# Patient Record
Sex: FEMALE | Race: WHITE | HISPANIC OR LATINO | Employment: FULL TIME | URBAN - METROPOLITAN AREA
[De-identification: names, ages, dates, MRNs, and addresses within clinical notes are randomized per-mention and may not be internally consistent; named-entity substitution may affect disease eponyms.]

---

## 2017-03-09 ENCOUNTER — GENERIC CONVERSION - ENCOUNTER (OUTPATIENT)
Dept: OTHER | Facility: OTHER | Age: 52
End: 2017-03-09

## 2017-03-24 ENCOUNTER — GENERIC CONVERSION - ENCOUNTER (OUTPATIENT)
Dept: OTHER | Facility: OTHER | Age: 52
End: 2017-03-24

## 2017-03-31 ENCOUNTER — HOSPITAL ENCOUNTER (EMERGENCY)
Facility: HOSPITAL | Age: 52
Discharge: HOME | End: 2017-03-31
Attending: EMERGENCY MEDICINE | Admitting: EMERGENCY MEDICINE
Payer: COMMERCIAL

## 2017-03-31 ENCOUNTER — APPOINTMENT (EMERGENCY)
Dept: RADIOLOGY | Facility: HOSPITAL | Age: 52
End: 2017-03-31
Payer: COMMERCIAL

## 2017-03-31 ENCOUNTER — APPOINTMENT (EMERGENCY)
Dept: NON INVASIVE DIAGNOSTICS | Facility: HOSPITAL | Age: 52
End: 2017-03-31
Attending: INTERNAL MEDICINE
Payer: COMMERCIAL

## 2017-03-31 VITALS
DIASTOLIC BLOOD PRESSURE: 63 MMHG | OXYGEN SATURATION: 100 % | SYSTOLIC BLOOD PRESSURE: 140 MMHG | WEIGHT: 117 LBS | HEART RATE: 89 BPM | BODY MASS INDEX: 20.73 KG/M2 | RESPIRATION RATE: 16 BRPM | TEMPERATURE: 98.3 F | HEIGHT: 63 IN

## 2017-03-31 DIAGNOSIS — R07.9 CHEST PAIN: Primary | ICD-10-CM

## 2017-03-31 LAB
ALBUMIN SERPL BCP-MCNC: 3.6 G/DL (ref 3.5–5)
ALP SERPL-CCNC: 46 U/L (ref 46–116)
ALT SERPL W P-5'-P-CCNC: 46 U/L (ref 12–78)
ANION GAP SERPL CALCULATED.3IONS-SCNC: 7 MMOL/L (ref 4–13)
APTT PPP: 28 SECONDS (ref 24–33)
AST SERPL W P-5'-P-CCNC: 20 U/L (ref 5–45)
BASOPHILS # BLD AUTO: 0 THOUSANDS/ΜL (ref 0–0.1)
BASOPHILS NFR BLD AUTO: 0 % (ref 0–1)
BILIRUB SERPL-MCNC: 0.4 MG/DL (ref 0.2–1)
BUN SERPL-MCNC: 13 MG/DL (ref 5–25)
CALCIUM SERPL-MCNC: 8.7 MG/DL (ref 8.3–10.1)
CHLORIDE SERPL-SCNC: 103 MMOL/L (ref 100–108)
CK SERPL-CCNC: 50 U/L (ref 26–192)
CO2 SERPL-SCNC: 31 MMOL/L (ref 21–32)
CREAT SERPL-MCNC: 0.66 MG/DL (ref 0.6–1.3)
EOSINOPHIL # BLD AUTO: 0.1 THOUSAND/ΜL (ref 0–0.61)
EOSINOPHIL NFR BLD AUTO: 1 % (ref 0–6)
ERYTHROCYTE [DISTWIDTH] IN BLOOD BY AUTOMATED COUNT: 12.7 % (ref 11.6–15.1)
GFR SERPL CREATININE-BSD FRML MDRD: >60 ML/MIN/1.73SQ M
GLUCOSE SERPL-MCNC: 107 MG/DL (ref 65–140)
HCT VFR BLD AUTO: 42.3 % (ref 37–47)
HGB BLD-MCNC: 14.2 G/DL (ref 12–16)
INR PPP: 1 (ref 0.86–1.16)
LYMPHOCYTES # BLD AUTO: 1.3 THOUSANDS/ΜL (ref 0.6–4.47)
LYMPHOCYTES NFR BLD AUTO: 17 % (ref 14–44)
MCH RBC QN AUTO: 29.8 PG (ref 27–31)
MCHC RBC AUTO-ENTMCNC: 33.7 G/DL (ref 31.4–37.4)
MCV RBC AUTO: 88 FL (ref 82–98)
MONOCYTES # BLD AUTO: 0.4 THOUSAND/ΜL (ref 0.17–1.22)
MONOCYTES NFR BLD AUTO: 6 % (ref 4–12)
NEUTROPHILS # BLD AUTO: 5.7 THOUSANDS/ΜL (ref 1.85–7.62)
NEUTS SEG NFR BLD AUTO: 76 % (ref 43–75)
NRBC BLD AUTO-RTO: 0 /100 WBCS
PLATELET # BLD AUTO: 229 THOUSANDS/UL (ref 130–400)
PMV BLD AUTO: 7.9 FL (ref 8.9–12.7)
POTASSIUM SERPL-SCNC: 3.5 MMOL/L (ref 3.5–5.3)
PROT SERPL-MCNC: 7.1 G/DL (ref 6.4–8.2)
PROTHROMBIN TIME: 10.5 SECONDS (ref 9.4–11.7)
RBC # BLD AUTO: 4.79 MILLION/UL (ref 4.2–5.4)
SODIUM SERPL-SCNC: 141 MMOL/L (ref 136–145)
TROPONIN I SERPL-MCNC: <0.02 NG/ML
WBC # BLD AUTO: 7.4 THOUSAND/UL (ref 4.8–10.8)

## 2017-03-31 PROCEDURE — 36415 COLL VENOUS BLD VENIPUNCTURE: CPT | Performed by: EMERGENCY MEDICINE

## 2017-03-31 PROCEDURE — 84484 ASSAY OF TROPONIN QUANT: CPT | Performed by: EMERGENCY MEDICINE

## 2017-03-31 PROCEDURE — 93017 CV STRESS TEST TRACING ONLY: CPT

## 2017-03-31 PROCEDURE — 80053 COMPREHEN METABOLIC PANEL: CPT | Performed by: EMERGENCY MEDICINE

## 2017-03-31 PROCEDURE — 71010 HB CHEST X-RAY 1 VIEW FRONTAL (PORTABLE): CPT

## 2017-03-31 PROCEDURE — 85730 THROMBOPLASTIN TIME PARTIAL: CPT | Performed by: EMERGENCY MEDICINE

## 2017-03-31 PROCEDURE — 85610 PROTHROMBIN TIME: CPT | Performed by: EMERGENCY MEDICINE

## 2017-03-31 PROCEDURE — 85025 COMPLETE CBC W/AUTO DIFF WBC: CPT | Performed by: EMERGENCY MEDICINE

## 2017-03-31 PROCEDURE — 99285 EMERGENCY DEPT VISIT HI MDM: CPT

## 2017-03-31 PROCEDURE — 93005 ELECTROCARDIOGRAM TRACING: CPT | Performed by: EMERGENCY MEDICINE

## 2017-03-31 PROCEDURE — 82550 ASSAY OF CK (CPK): CPT | Performed by: EMERGENCY MEDICINE

## 2017-03-31 RX ORDER — ASPIRIN 81 MG/1
81 TABLET, CHEWABLE ORAL ONCE
Status: COMPLETED | OUTPATIENT
Start: 2017-03-31 | End: 2017-03-31

## 2017-03-31 RX ADMIN — ASPIRIN 81 MG CHEWABLE TABLET 81 MG: 81 TABLET CHEWABLE at 14:32

## 2017-04-03 LAB
MAX DIASTOLIC BP: 80 MMHG
MAX HEART RATE: 173 BPM
MAX PREDICTED HEART RATE: 168 BPM
MAX. SYSTOLIC BP: 148 MMHG
PROTOCOL NAME: NORMAL
TIME IN EXERCISE PHASE: 731 S

## 2017-04-05 LAB
ATRIAL RATE: 89 BPM
P AXIS: 76 DEGREES
PR INTERVAL: 140 MS
QRS AXIS: 67 DEGREES
QRSD INTERVAL: 78 MS
QT INTERVAL: 360 MS
QTC INTERVAL: 438 MS
T WAVE AXIS: 65 DEGREES
VENTRICULAR RATE: 89 BPM

## 2017-04-10 ENCOUNTER — ALLSCRIPTS OFFICE VISIT (OUTPATIENT)
Dept: OTHER | Facility: OTHER | Age: 52
End: 2017-04-10

## 2017-04-10 LAB
BILIRUB UR QL STRIP: NORMAL
CLARITY UR: NORMAL
COLOR UR: YELLOW
GLUCOSE (HISTORICAL): NORMAL
HGB UR QL STRIP.AUTO: NORMAL
KETONES UR STRIP-MCNC: NORMAL MG/DL
LEUKOCYTE ESTERASE UR QL STRIP: 25
NITRITE UR QL STRIP: NORMAL
PH UR STRIP.AUTO: 5 [PH]
PROT UR STRIP-MCNC: NORMAL MG/DL
SP GR UR STRIP.AUTO: 1.02
UROBILINOGEN UR QL STRIP.AUTO: NORMAL

## 2017-04-14 ENCOUNTER — GENERIC CONVERSION - ENCOUNTER (OUTPATIENT)
Dept: OTHER | Facility: OTHER | Age: 52
End: 2017-04-14

## 2017-04-14 ENCOUNTER — APPOINTMENT (OUTPATIENT)
Dept: LAB | Facility: CLINIC | Age: 52
End: 2017-04-14
Payer: COMMERCIAL

## 2017-04-14 ENCOUNTER — TRANSCRIBE ORDERS (OUTPATIENT)
Dept: LAB | Facility: CLINIC | Age: 52
End: 2017-04-14

## 2017-04-14 DIAGNOSIS — E04.1 NONTOXIC UNINODULAR GOITER: Primary | ICD-10-CM

## 2017-04-14 LAB — VENIPUNCTURE: NORMAL

## 2017-04-14 PROCEDURE — 36415 COLL VENOUS BLD VENIPUNCTURE: CPT | Performed by: FAMILY MEDICINE

## 2017-04-15 LAB
AMYLASE (HISTORICAL): 69 U/L (ref 31–124)
CHOLEST SERPL-MCNC: 215 MG/DL (ref 100–199)
CHOLEST/HDLC SERPL: 2.7 RATIO UNITS (ref 0–4.4)
EBV EARLY ANTIGEN AB, IGG (HISTORICAL): <9 U/ML (ref 0–8.9)
EPSTEIN-BARR VCA IGM (HISTORICAL): <36 U/ML (ref 0–35.9)
HDLC SERPL-MCNC: 81 MG/DL
LDLC SERPL CALC-MCNC: 119 MG/DL (ref 0–99)
LIPASE SERPL-CCNC: 36 U/L (ref 0–59)
LYME IGG/IGM AB (HISTORICAL): <0.91 ISR (ref 0–0.9)
LYME IGM (HISTORICAL): <0.8 INDEX (ref 0–0.79)
TRIGL SERPL-MCNC: 74 MG/DL (ref 0–149)
VLDLC SERPL CALC-MCNC: 15 MG/DL (ref 5–40)

## 2017-04-17 LAB — 25(OH)D3 SERPL-MCNC: 20 NG/ML

## 2017-05-01 ENCOUNTER — GENERIC CONVERSION - ENCOUNTER (OUTPATIENT)
Dept: OTHER | Facility: OTHER | Age: 52
End: 2017-05-01

## 2018-01-12 NOTE — MISCELLANEOUS
Message  Return to work or school:   Anthony Rome is under my professional care  She was seen in my office on 4/10/2017        Dr Binta Shirley-jennifer/dorita        Signatures   Electronically signed by : DIEGO Andrews ; Apr 13 2017 10:51PM EST                       (Author)

## 2018-01-12 NOTE — PROGRESS NOTES
Assessment    1  Encounter for preventive health examination (V70 0) (Z00 00)   2  Non-toxic uninodular goiter (241 0) (E04 1)   3  Colon cancer screening (V76 51) (Z12 11)   4  Osteopenia (733 90) (M85 80)   5  Chronic fatigue (780 79) (R53 82)   6  Tick bite (919 4,E906 4) (W57 XXXA)    Plan  Chronic fatigue    · (1) GEETA-BARR VIRUS EARLY ANTIGEN ANTIBODY, IGG; Status:Active; Requested  for:03Ykq4459;    · (1) GEETA-WEINSTEIN VIRUS VCA, IGM; Status:Active; Requested for:90Swd3228;   Chronic fatigue, Tick bite    · (LC) Lyme Ab/Western Blot Reflex; Status:Active; Requested for:97Bjm0608;   Colon cancer screening    · Gastroenterology Referral Other Co-Management  *  Status: Hold For - Scheduling   Requested for: 10Apr2017  : Dr Domitila Reynolds, [address] [facility] [place]  are Referring to a non- Preferred Provider : Established Patient  Care Summary provided  : Yes  Health Maintenance    · Urine Dip Automated- POC; Status:Complete;   Done: 57AOF9226 11:59AM  Health Maintenance, Non-toxic uninodular goiter    · (1) AMYLASE; Status:Active; Requested for:15Syv9706;    · (1) LIPASE; Status:Active; Requested for:39Xgj7595; Health Maintenance, Non-toxic uninodular goiter, Osteopenia    · (LC) Vitamin D, 25-Hydroxy, Total; Status:Active; Requested for:12Sex9734;   Non-toxic uninodular goiter    · (1) LIPID PANEL, FASTING; Status:Active; Requested for:71Tef9890;     Discussion/Summary  health maintenance visit Currently, she eats an adequate diet  cervical cancer screening is current Breast cancer screening: the risks and benefits of breast cancer screening were discussed  Colorectal cancer screening: the risks and benefits of colorectal cancer screening were discussed  Osteoporosis screening: the risks and benefits of osteoporosis screening were discussed  Screening lab work includes hemoglobin, glucose, lipid profile, thyroid function testing, 25-hydroxyvitamin D and urinalysis   Advice and education were given regarding nutrition, weight bearing exercise, calcium supplements, vitamin D supplements, self skin examination, helmet use and advanced directive planning  Patient discussion: discussed with the patient  Chief Complaint  pt here for PE  last week pt was having chest pain , left arm pain ,nauseaous,she went to ER  -work-up negative  ftc/cma      History of Present Illness  HM, Adult Female: The patient is being seen for a Dr Simon Savannah (was with Dr Fabrizio Riley) evaluation  General Health: The patient's health since the last visit is described as good  She has regular dental visits  She denies vision problems  Lifestyle:  She does not use tobacco  She consumes alcohol  She reports occasional alcohol use  She denies drug use  Screening: cancer screening reviewed and updated  metabolic screening reviewed and updated  risk screening reviewed and updated  Review of Systems    Constitutional: feeling tired  Eyes: eyesight problems  Cardiovascular: see ER note  Respiratory: No complaints of shortness of breath, no wheezing, no cough, no SOB on exertion, no orthopnea, no PND  Gastrointestinal: No complaints of abdominal pain, no constipation, no nausea or vomiting, no diarrhea, no bloody stools  Musculoskeletal: arthralgias and myalgias  Integumentary:   skin lesion  Neurological: No complaints of headache, no confusion, no convulsions, no numbness, no dizziness or fainting, no tingling, no limb weakness, no difficulty walking  Psychiatric: anxiety, sleep disturbances and depression, but not suicidal       Active Problems    1  Acute sinusitis, recurrence not specified, unspecified location (461 9) (J01 90)   2  Chronic fatigue (780 79) (R53 82)   3  Colon cancer screening (V76 51) (Z12 11)   4  Non-toxic uninodular goiter (241 0) (E04 1)   5  Osteopenia (733 90) (M85 80)   6  Tick bite (919 4,E906 4) (W57 XXXA)   7   URTI (acute upper respiratory infection) (465 9) (J06 9)    Surgical History  right breast fibrocyst, tubal ligation, [id number]     Family History  Mother    · Family history of malignant neoplasm of thyroid (V16 8) (Z80 8)    maternal cousins--Breast cancer     Social History    · Being A Social Drinker   · Daily Coffee Consumption (1  Cups/Day)   · Never a smoker   · Never A Smoker    Allergies    1  No Known Drug Allergies    Vitals   Recorded: 10Apr2017 11:44AM   Temperature 97 8 F, Temporal   Heart Rate 82, R Radial   Pulse Quality Normal, R Radial   Respiration Quality Normal   Respiration 14   Systolic 507, RUE, Sitting   Diastolic 60, RUE, Sitting   Height 5 ft 2 5 in   Weight 119 lb    BMI Calculated 21 42   BSA Calculated 1 54   O2 Saturation 94     Physical Exam    Constitutional   General appearance: No acute distress, well appearing and well nourished  Eyes   Conjunctiva and lids: No swelling, erythema or discharge  Pupils and irises: Equal, round, reactive to light  Ears, Nose, Mouth, and Throat   External inspection of ears and nose: Normal     Otoscopic examination: Tympanic membranes translucent with normal light reflex  Canals patent without erythema  Hearing: Normal     Nasal mucosa, septum, and turbinates: Normal without edema or erythema  Lips, teeth, and gums: Normal, good dentition  Oropharynx: Normal with no erythema, edema, exudate or lesions  Neck   Neck: Supple, symmetric, trachea midline, no masses  Thyroid: Normal, no thyromegaly  Pulmonary   Respiratory effort: No increased work of breathing or signs of respiratory distress  Auscultation of lungs: Clear to auscultation  Cardiovascular   Auscultation of heart: Normal rate and rhythm, normal S1 and S2, no murmurs  Carotid pulses: 2+ bilaterally  Pedal pulses: 2+ bilaterally  Examination of extremities for edema and/or varicosities: Normal     Abdomen   Abdomen: Non-tender, no masses  Liver and spleen: No hepatomegaly or splenomegaly  Lymphatic   Palpation of lymph nodes in neck: No lymphadenopathy  Musculoskeletal   Gait and station: Normal     Digits and nails: Normal without clubbing or cyanosis  Joints, bones, and muscles: Normal     Range of motion: Normal     Stability: Normal     Muscle strength/tone: Normal     Skin   Skin and subcutaneous tissue: Normal without rashes or lesions  Neurologic   Cranial nerves: Cranial nerves II-XII intact  Reflexes: 2+ and symmetric  Sensation: No sensory loss  Psychiatric   Orientation to person, place, and time: Normal     Mood and affect: Normal        Results/Data  Urine Dip Automated- POC 10Apr2017 11:59AM Venetta Antes     Test Name Result Flag Reference   Color Yellow     Clarity Transparent     Leukocytes 25     Nitrite neg     Blood neg     Bilirubin neg     Urobilinogen norm     Protein neg     Ph 5     Specific Gravity 1 020     Ketone neg     Glucose norm       PHQ-2 Adult Depression Screening 10Apr2017 11:53AM User, Ahs     Test Name Result Flag Reference   PHQ-2 Adult Depression Score 0     Over the last two weeks, how often have you been bothered by any of the following problems? Little interest or pleasure in doing things: Not at all - 0  Feeling down, depressed, or hopeless: Not at all - 0   PHQ-2 Adult Depression Screening Negative         Procedure    Procedure: Visual Acuity Test    Indication: routine screening  Inforrmation supplied by      Results: 20/25 in both eyes without corrective device, 20/25 in the right eye without corrective device, 20/30 in the left eye without corrective device      Signatures   Electronically signed by : DIEGO Pizarro ; Apr 24 2017 10:05AM EST                       (Author)

## 2018-01-13 NOTE — PROGRESS NOTES
History of Present Illness  Care Coordination Encounter Information:   Type of Encounter: Telephonic   Last Office Visit: 10/13/16   Spoke to Patient  Care Coordination SL Nurse Mumtaz Dates:   The reason for call is to discuss outreach for follow up/needed services  I left a voice message for Matty Adame as she is overdue for a physical or follow up appointment  i left all pertinent contact information and office hours with the message  Active Problems    1  Acute sinusitis, recurrence not specified, unspecified location (461 9) (J01 90)   2  Non-toxic uninodular goiter (241 0) (E04 1)   3  URTI (acute upper respiratory infection) (465 9) (J06 9)    Family History  Mother    1  Family history of malignant neoplasm of thyroid (V16 8) (Z80 8)    Social History    · Being A Social Drinker   · Daily Coffee Consumption (1  Cups/Day)   · Never A Smoker    Allergies    1  No Known Drug Allergies    Signatures   Electronically signed by :  Aleshia Lu RN; Mar  9 2017  1:18PM EST                       (Author)

## 2018-01-14 VITALS
HEART RATE: 82 BPM | DIASTOLIC BLOOD PRESSURE: 60 MMHG | HEIGHT: 63 IN | WEIGHT: 119 LBS | RESPIRATION RATE: 14 BRPM | BODY MASS INDEX: 21.09 KG/M2 | OXYGEN SATURATION: 94 % | SYSTOLIC BLOOD PRESSURE: 106 MMHG | TEMPERATURE: 97.8 F

## 2018-01-16 NOTE — RESULT NOTES
Discussion/Summary   Vit D low--rec supplement vit D--7855-3743 IU daily--can get OTC-please call w further questions/concerns--follow-up to rpt level in 3-4 mths--best regards-Dr Jennifer Earl     Verified Results  (1) AMYLASE 14Apr2017 12:00AM [facility]     Test Name Result Flag Reference   Amylase, Serum 69 U/L       (1) LIPASE 25Dcq4700 12:00AM [facility]     Test Name Result Flag Reference   Lipase, Serum 36 U/L  0-59     (1) LIPID PANEL, FASTING 14Apr2017 12:00AM [facility]     Test Name Result Flag Reference   Cholesterol, Total 215 mg/dL H 100-199   Triglycerides 74 mg/dL  0-149   HDL Cholesterol 81 mg/dL  >39   VLDL Cholesterol Juanito 15 mg/dL  5-40   LDL Cholesterol Calc 119 mg/dL H 0-99   T  Chol/HDL Ratio 2 7 ratio units  0 0-4 4   T  Chol/HDL Ratio                                                             Men  Women                                               1/2 Avg  Risk  3 4    3 3                                                   Avg Risk  5 0    4 4                                                2X Avg  Risk  9 6    7 1                                                3X Avg  Risk 23 4   11 0     (LC) Vitamin D, 25-Hydroxy, Total 14Apr2017 12:00AM [facility]     Test Name Result Flag Reference   Vitamin D, 25-Hydroxy, Serum 20 ng/mL L    Reference Range:   All Ages: Target levels 30 - 100     (LC) Lyme Ab/Western Blot Reflex 14Apr2017 12:00AM [facility]     Test Name Result Flag Reference   Lyme IgG/IgM Ab <0 91 ISR  0 00-0 90   Negative         <0 91                                                 Equivocal  0 91 - 1 09                                                 Positive         >1 09   Lyme Disease Ab, Quant, IgM <0 80 index  0 00-0 79   Negative         <0 80                                                 Equivocal  0 80 - 1 19                                                 Positive         >1 19                  IgM levels may peak at 3-6 weeks post infection, then                  gradually decline       (LC) EBV Ab VCA, IgM 71Cnt7269 12:00AM Vanessa Bone     Test Name Result Flag Reference   EBV Ab VCA, IgM <36 0 U/mL  0 0-35 9   Negative        <36 0                                                  Equivocal 36 0 - 43 9                                                  Positive        >43 9     (LC) EBV Early Antigen Ab, IgG 65Sdt6153 12:00AM Howie Shirley     Test Name Result Flag Reference   EBV Early Antigen Ab, IgG <9 0 U/mL  0 0-8 9   Negative        < 9 0                                                  Equivocal  9 0 - 10 9                                                  Positive        >10 9

## 2018-01-16 NOTE — RESULT NOTES
Verified Results  Urine Dip Automated- POC 10Apr2017 11:59AM Yari Stapleton     Test Name Result Flag Reference   Color Yellow     Clarity Transparent     Leukocytes 25     Nitrite neg     Blood neg     Bilirubin neg     Urobilinogen norm     Protein neg     Ph 5     Specific Gravity 1 020     Ketone neg     Glucose norm         Plan  Chronic fatigue    · (1) GEETA-BARR VIRUS EARLY ANTIGEN ANTIBODY, IGG; Status:Active; Requested  for:10Apr2017;    · (1) GEETA-WEINSTEIN VIRUS VCA, IGM; Status:Active; Requested for:10Apr2017;   Chronic fatigue, Tick bite    · (LC) Lyme Ab/Western Blot Reflex; Status:Active; Requested for:10Apr2017;   Colon cancer screening    · Gastroenterology Referral Other Co-Management  *  Status: Hold For - Scheduling   Requested for: 10Apr2017  : Dr Isela Steel  [place], [place]  are Referring to a non- Preferred Provider : Established Patient  Care Summary provided  : Yes  Health Maintenance    · Urine Dip Automated- POC; Status:Complete;   Done: 34VNW6375 11:59AM  Health Maintenance, Non-toxic uninodular goiter    · (1) AMYLASE; Status:Active; Requested for:10Apr2017;    · (1) LIPASE; Status:Active; Requested for:10Apr2017; Health Maintenance, Non-toxic uninodular goiter, Osteopenia    · (LC) Vitamin D, 25-Hydroxy, Total; Status:Active; Requested for:10Apr2017;   Non-toxic uninodular goiter    · (1) LIPID PANEL, FASTING; Status:Active;  Requested for:10Apr2017;

## 2018-02-21 ENCOUNTER — OFFICE VISIT (OUTPATIENT)
Dept: FAMILY MEDICINE CLINIC | Facility: CLINIC | Age: 53
End: 2018-02-21
Payer: COMMERCIAL

## 2018-02-21 VITALS
HEART RATE: 104 BPM | RESPIRATION RATE: 16 BRPM | HEIGHT: 63 IN | SYSTOLIC BLOOD PRESSURE: 130 MMHG | TEMPERATURE: 98.8 F | DIASTOLIC BLOOD PRESSURE: 70 MMHG

## 2018-02-21 DIAGNOSIS — R68.89 FLU-LIKE SYMPTOMS: Primary | ICD-10-CM

## 2018-02-21 PROBLEM — M85.80 OSTEOPENIA: Status: ACTIVE | Noted: 2017-04-10

## 2018-02-21 PROCEDURE — 99213 OFFICE O/P EST LOW 20 MIN: CPT | Performed by: NURSE PRACTITIONER

## 2018-02-21 RX ORDER — OSELTAMIVIR PHOSPHATE 75 MG/1
75 CAPSULE ORAL 2 TIMES DAILY
Qty: 10 CAPSULE | Refills: 0 | Status: SHIPPED | OUTPATIENT
Start: 2018-02-21 | End: 2018-02-26

## 2018-02-21 NOTE — PATIENT INSTRUCTIONS
Influenza   AMBULATORY CARE:   Influenza  (the flu) is an infection caused by the influenza virus  The flu is easily spread when an infected person coughs, sneezes, or has close contact with others  You may be able to spread the flu to others for 1 week or longer after signs or symptoms appear  Common signs and symptoms include the following:   · Fever and chills    · Headaches, body aches, and muscle or joint pain    · Cough, runny nose, and sore throat    · Loss of appetite, nausea, vomiting, or diarrhea    · Tiredness    · Trouble breathing  Call 911 for any of the following:   · You have trouble breathing, and your lips look purple or blue  · You have a seizure  Seek care immediately if:   · You are dizzy, or you are urinating less or not at all  · You have a headache with a stiff neck, and you feel tired or confused  · You have new pain or pressure in your chest     · Your symptoms, such as shortness of breath, vomiting, or diarrhea, get worse  · Your symptoms, such as fever and coughing, seem to get better, but then get worse  Contact your healthcare provider if:   · You have new muscle pain or weakness  · You have questions or concerns about your condition or care  Treatment for influenza  may include any of the following:  · Acetaminophen  decreases pain and fever  It is available without a doctor's order  Ask how much to take and how often to take it  Follow directions  Acetaminophen can cause liver damage if not taken correctly  · NSAIDs , such as ibuprofen, help decrease swelling, pain, and fever  This medicine is available with or without a doctor's order  NSAIDs can cause stomach bleeding or kidney problems in certain people  If you take blood thinner medicine, always ask your healthcare provider if NSAIDs are safe for you  Always read the medicine label and follow directions  · Antivirals  help fight a viral infection    Manage your symptoms:   · Rest  as much as you can to help you recover  · Drink liquids as directed  to help prevent dehydration  Ask how much liquid to drink each day and which liquids are best for you  Prevent the spread of the flu:   · Wash your hands often  Use soap and water  Wash your hands after you use the bathroom, change a child's diapers, or sneeze  Wash your hands before you prepare or eat food  Use gel hand cleanser when soap and water are not available  Do not touch your eyes, nose, or mouth unless you have washed your hands first            · Cover your mouth when you sneeze or cough  Cough into a tissue or the bend of your arm  · Clean shared items with a germ-killing   Clean table surfaces, doorknobs, and light switches  Do not share towels, silverware, and dishes with people who are sick  Wash bed sheets, towels, silverware, and dishes with soap and water  · Wear a mask  over your mouth and nose if you are sick or are near anyone who is sick  · Stay away from others  if you are sick  · Influenza vaccine  helps prevent influenza (flu)  Everyone older than 6 months should get a yearly influenza vaccine  Get the vaccine as soon as it is available, usually in September or October each year  Follow up with your healthcare provider as directed:  Write down your questions so you remember to ask them during your visits  © 2017 [employer] Information is for End User's use only and may not be sold, redistributed or otherwise used for commercial purposes  All illustrations and images included in CareNotes® are the copyrighted property of [employer]  or [employer]  The above information is an  only  It is not intended as medical advice for individual conditions or treatments  Talk to your doctor, nurse or pharmacist before following any medical regimen to see if it is safe and effective for you

## 2018-02-21 NOTE — PROGRESS NOTES
Altaf Shore  is a 47 y o  female who presents for evaluation of sudden onset of influenza like symptoms  Symptoms include chills, dry cough, headache, myalgias, sinus and nasal congestion, sore throat and fever and have been present for 1 day  She has tried to alleviate the symptoms with ibuprofen with no relief  High risk factors for influenza complications: none  She reports flu exposure  Daughter was dx'd with flu    The following portions of the patient's history were reviewed and updated as appropriate: allergies, current medications, past family history, past medical history, past social history, past surgical history and problem list     Review of Systems  Pertinent items are noted in HPI       Objective     /70 (BP Location: Left arm, Patient Position: Sitting, Cuff Size: Standard)   Pulse 104   Temp 98 8 °F (37 1 °C) (Tympanic)   Resp 16   Ht 5' 3" (1 6 m)   General appearance: alert and oriented, in no acute distress  Head: Normocephalic, without obvious abnormality, sinuses nontender to percussion  Ears: normal TM's and external ear canals both ears  Nose: clear discharge, turbinates red  Throat: abnormal findings: mild oropharyngeal erythema  Lungs: clear to auscultation bilaterally  Heart: regular rate and rhythm, S1, S2 normal, no murmur, click, rub or gallop  Abdomen: soft, non-tender; bowel sounds normal; no masses,  no organomegaly  Pulses: 2+ and symmetric  Lymph nodes: Cervical, supraclavicular, and axillary nodes normal   Neurologic: Grossly normal     Assessment/Plan     Influenza like syndrome  Supportive care with appropriate antipyretics and fluids  Educational material distributed and questions answered  Antivirals per orders  Follow up in 5 days or as needed  James Solorio

## 2018-02-21 NOTE — LETTER
February 21, 2018     Patient: Blake Gomez   YOB: 1965   Date of Visit: 2/21/2018       To Whom it May Concern:    Blake Gomez is under my professional care  She was seen in my office on 2/21/2018  She may return to work on 2/26/18  If you have any questions or concerns, please don't hesitate to call           Sincerely,          Georgianna Goodell, CRNP        CC: No Recipients

## 2018-02-25 ENCOUNTER — APPOINTMENT (EMERGENCY)
Dept: RADIOLOGY | Facility: HOSPITAL | Age: 53
End: 2018-02-25
Payer: COMMERCIAL

## 2018-02-25 ENCOUNTER — HOSPITAL ENCOUNTER (EMERGENCY)
Facility: HOSPITAL | Age: 53
Discharge: HOME | End: 2018-02-25
Attending: EMERGENCY MEDICINE | Admitting: EMERGENCY MEDICINE
Payer: COMMERCIAL

## 2018-02-25 VITALS
WEIGHT: 120 LBS | BODY MASS INDEX: 21.26 KG/M2 | RESPIRATION RATE: 18 BRPM | HEIGHT: 63 IN | TEMPERATURE: 100.7 F | SYSTOLIC BLOOD PRESSURE: 129 MMHG | HEART RATE: 105 BPM | OXYGEN SATURATION: 96 % | DIASTOLIC BLOOD PRESSURE: 74 MMHG

## 2018-02-25 DIAGNOSIS — J40 BRONCHITIS: Primary | ICD-10-CM

## 2018-02-25 DIAGNOSIS — J11.1 INFLUENZA: ICD-10-CM

## 2018-02-25 LAB — S PYO AG THROAT QL: NEGATIVE

## 2018-02-25 PROCEDURE — 71046 X-RAY EXAM CHEST 2 VIEWS: CPT

## 2018-02-25 PROCEDURE — 87430 STREP A AG IA: CPT | Performed by: EMERGENCY MEDICINE

## 2018-02-25 PROCEDURE — 99285 EMERGENCY DEPT VISIT HI MDM: CPT

## 2018-02-25 PROCEDURE — 87070 CULTURE OTHR SPECIMN AEROBIC: CPT | Performed by: EMERGENCY MEDICINE

## 2018-02-25 RX ORDER — ACETAMINOPHEN 325 MG/1
650 TABLET ORAL ONCE
Status: COMPLETED | OUTPATIENT
Start: 2018-02-25 | End: 2018-02-25

## 2018-02-25 RX ORDER — IBUPROFEN 200 MG
400 TABLET ORAL EVERY 6 HOURS PRN
COMMUNITY
End: 2018-04-05

## 2018-02-25 RX ORDER — IPRATROPIUM BROMIDE AND ALBUTEROL SULFATE 2.5; .5 MG/3ML; MG/3ML
3 SOLUTION RESPIRATORY (INHALATION) ONCE
Status: DISCONTINUED | OUTPATIENT
Start: 2018-02-25 | End: 2018-02-25 | Stop reason: HOSPADM

## 2018-02-25 RX ORDER — BENZONATATE 100 MG/1
100 CAPSULE ORAL EVERY 8 HOURS
Qty: 12 CAPSULE | Refills: 0 | Status: SHIPPED | OUTPATIENT
Start: 2018-02-25 | End: 2018-02-28

## 2018-02-25 RX ORDER — METHYLPREDNISOLONE SODIUM SUCCINATE 125 MG/2ML
125 INJECTION, POWDER, LYOPHILIZED, FOR SOLUTION INTRAMUSCULAR; INTRAVENOUS ONCE
Status: DISCONTINUED | OUTPATIENT
Start: 2018-02-25 | End: 2018-02-25

## 2018-02-25 RX ORDER — KETOROLAC TROMETHAMINE 30 MG/ML
15 INJECTION, SOLUTION INTRAMUSCULAR; INTRAVENOUS ONCE
Status: DISCONTINUED | OUTPATIENT
Start: 2018-02-25 | End: 2018-02-25

## 2018-02-25 RX ORDER — ALBUTEROL SULFATE 90 UG/1
2 AEROSOL, METERED RESPIRATORY (INHALATION) EVERY 4 HOURS PRN
Qty: 1 INHALER | Refills: 0 | Status: SHIPPED | OUTPATIENT
Start: 2018-02-25 | End: 2018-04-05

## 2018-02-25 RX ORDER — PREDNISONE 20 MG/1
40 TABLET ORAL DAILY
Qty: 10 TABLET | Refills: 0 | Status: SHIPPED | OUTPATIENT
Start: 2018-02-25 | End: 2018-03-02

## 2018-02-25 RX ADMIN — ACETAMINOPHEN 650 MG: 325 TABLET, FILM COATED ORAL at 19:42

## 2018-02-26 NOTE — DISCHARGE INSTRUCTIONS
Acute Bronchitis   WHAT YOU NEED TO KNOW:   Acute bronchitis is swelling and irritation in the air passages of your lungs  This irritation may cause you to cough or have other breathing problems  Acute bronchitis often starts because of another illness, such as a cold or the flu  The illness spreads from your nose and throat to your windpipe and airways  Bronchitis is often called a chest cold  Acute bronchitis lasts about 3 to 6 weeks and is usually not a serious illness  Your cough can last for several weeks  DISCHARGE INSTRUCTIONS:   Return to the emergency department if:   · You cough up blood  · Your lips or fingernails turn blue  · You feel like you are not getting enough air when you breathe  Contact your healthcare provider if:   · You have a fever  · Your breathing problems do not go away or get worse  · Your cough does not get better within 4 weeks  · You have questions or concerns about your condition or care  Self-care:   · Get more rest   Rest helps your body to heal  Slowly start to do more each day  Rest when you feel it is needed  · Avoid irritants in the air  Avoid chemicals, fumes, and dust  Wear a face mask if you must work around dust or fumes  Stay inside on days when air pollution levels are high  If you have allergies, stay inside when pollen counts are high  Do not use aerosol products, such as spray-on deodorant, bug spray, and hair spray  · Do not smoke or be around others who smoke  Nicotine and other chemicals in cigarettes and cigars damages the cilia that move mucus out of your lungs  Ask your healthcare provider for information if you currently smoke and need help to quit  E-cigarettes or smokeless tobacco still contain nicotine  Talk to your healthcare provider before you use these products  · Drink liquids as directed  Liquids help keep your air passages moist and help you cough up mucus   You may need to drink more liquids when you have acute bronchitis  Ask how much liquid to drink each day and which liquids are best for you  · Use a humidifier or vaporizer  Use a cool mist humidifier or a vaporizer to increase air moisture in your home  This may make it easier for you to breathe and help decrease your cough  Decrease risk for acute bronchitis:   · Get the vaccinations you need  Ask your healthcare provider if you should get vaccinated against the flu or pneumonia  · Prevent the spread of germs  You can decrease your risk of acute bronchitis and other illnesses by doing the following:     [facility] your hands often with soap and water  Carry germ-killing hand lotion or gel with you  You can use the lotion or gel to clean your hands when soap and water are not available  ¨ Do not touch your eyes, nose, or mouth unless you have washed your hands first     ¨ Always cover your mouth when you cough to prevent the spread of germs  It is best to cough into a tissue or your shirt sleeve instead of into your hand  Ask those around you cover their mouths when they cough  ¨ Try to avoid people who have a cold or the flu  If you are sick, stay away from others as much as possible  Medicines: Your healthcare provider may  give you any of the following:  · Ibuprofen or acetaminophen  are medicines that help lower your fever  They are available without a doctor's order  Ask your healthcare provider which medicine is right for you  Ask how much to take and how often to take it  Follow directions  These medicines can cause stomach bleeding if not taken correctly  Ibuprofen can cause kidney damage  Do not take ibuprofen if you have kidney disease, an ulcer, or allergies to aspirin  Acetaminophen can cause liver damage  Do not take more than 4,000 milligrams in 24 hours  · Decongestants  help loosen mucus in your lungs and make it easier to cough up  This can help you breathe easier  · Cough suppressants  decrease your urge to cough   If your cough produces mucus, do not take a cough suppressant unless your healthcare provider tells you to  Your healthcare provider may suggest that you take a cough suppressant at night so you can rest     · Inhalers  may be given  Your healthcare provider may give you one or more inhalers to help you breathe easier and cough less  An inhaler gives your medicine to open your airways  Ask your healthcare provider to show you how to use your inhaler correctly  · Take your medicine as directed  Contact your healthcare provider if you think your medicine is not helping or if you have side effects  Tell him of her if you are allergic to any medicine  Keep a list of the medicines, vitamins, and herbs you take  Include the amounts, and when and why you take them  Bring the list or the pill bottles to follow-up visits  Carry your medicine list with you in case of an emergency  Follow up with your healthcare provider as directed:  Write down questions you have so you will remember to ask them during your follow-up visits  © 2017 [address] Information is for End User's use only and may not be sold, redistributed or otherwise used for commercial purposes  All illustrations and images included in CareNotes® are the copyrighted property of A D A M , Inc  or [employer] Ma  The above information is an  only  It is not intended as medical advice for individual conditions or treatments  Talk to your doctor, nurse or pharmacist before following any medical regimen to see if it is safe and effective for you  Influenza   WHAT YOU NEED TO KNOW:   Influenza (the flu) is an infection caused by the influenza virus  The flu is easily spread when an infected person coughs, sneezes, or has close contact with others  You may be able to spread the flu to others for 1 week or longer after signs or symptoms appear     DISCHARGE INSTRUCTIONS:   Call 911 for any of the following:   · You have trouble breathing, and your lips look purple or blue  · You have a seizure  Return to the emergency department if:   · You are dizzy, or you are urinating less or not at all  · You have a headache with a stiff neck, and you feel tired or confused  · You have new pain or pressure in your chest     · Your symptoms, such as shortness of breath, vomiting, or diarrhea, get worse  · Your symptoms, such as fever and coughing, seem to get better, but then get worse  Contact your healthcare provider if:   · You have new muscle pain or weakness  · You have questions or concerns about your condition or care  Medicines: You may need any of the following:  · Acetaminophen  decreases pain and fever  It is available without a doctor's order  Ask how much to take and how often to take it  Follow directions  Acetaminophen can cause liver damage if not taken correctly  · NSAIDs , such as ibuprofen, help decrease swelling, pain, and fever  This medicine is available with or without a doctor's order  NSAIDs can cause stomach bleeding or kidney problems in certain people  If you take blood thinner medicine, always ask your healthcare provider if NSAIDs are safe for you  Always read the medicine label and follow directions  · Antivirals  help fight a viral infection  · Take your medicine as directed  Contact your healthcare provider if you think your medicine is not helping or if you have side effects  Tell him or her if you are allergic to any medicine  Keep a list of the medicines, vitamins, and herbs you take  Include the amounts, and when and why you take them  Bring the list or the pill bottles to follow-up visits  Carry your medicine list with you in case of an emergency  Rest  as much as you can to help you recover  Drink liquids as directed  to help prevent dehydration  Ask how much liquid to drink each day and which liquids are best for you  Prevent the spread of influenza:   · Wash your hands often  Use soap and water  Wash your hands after you use the bathroom, change a child's diapers, or sneeze  Wash your hands before you prepare or eat food  Use gel hand cleanser when soap and water are not available  Do not touch your eyes, nose, or mouth unless you have washed your hands first            · Cover your mouth when you sneeze or cough  Cough into a tissue or the bend of your arm  · Clean shared items with a germ-killing   Clean table surfaces, doorknobs, and light switches  Do not share towels, silverware, and dishes with people who are sick  Wash bed sheets, towels, silverware, and dishes with soap and water  · Wear a mask  over your mouth and nose if you are sick or are near anyone who is sick  · Stay away from others  if you are sick  · Influenza vaccine  helps prevent influenza (flu)  Everyone older than 6 months should get a yearly influenza vaccine  Get the vaccine as soon as it is available, usually in September or October each year  Follow up with your healthcare provider as directed:  Write down your questions so you remember to ask them during your visits  © 2017 [facility] Information is for End User's use only and may not be sold, redistributed or otherwise used for commercial purposes  All illustrations and images included in CareNotes® are the copyrighted property of [employer]  or [employer]  The above information is an  only  It is not intended as medical advice for individual conditions or treatments  Talk to your doctor, nurse or pharmacist before following any medical regimen to see if it is safe and effective for you

## 2018-02-26 NOTE — ED PROVIDER NOTES
History  Chief Complaint   Patient presents with    Shortness of Breath     Dx with the flu on Thursday, still not better, c/o shortness of breath and cough,      54-year-old female presents with generalized body aches cough shortness of breath congestion sore throat for the past 3 days  She saw her family doctor who said she had the flu  Patient refused Tamiflu at the time  She still does not want it  She denies any nausea vomiting abdominal pain diarrhea  She says she is hydrating appropriately  History provided by:  Patient   used: No        Prior to Admission Medications   Prescriptions Last Dose Informant Patient Reported? Taking?   ibuprofen (ADVIL) 200 mg tablet 2/25/2018 at 1500  Yes Yes   Sig: Take 400 mg by mouth every 6 (six) hours as needed for mild pain   oseltamivir (TAMIFLU) 75 mg capsule Unknown at Unknown time  No No   Sig: Take 1 capsule (75 mg total) by mouth 2 (two) times a day for 5 days      Facility-Administered Medications: None       History reviewed  No pertinent past medical history  Past Surgical History:   Procedure Laterality Date    TUBAL LIGATION         History reviewed  No pertinent family history  I have reviewed and agree with the history as documented  Social History   Substance Use Topics    Smoking status: Never Smoker    Smokeless tobacco: Never Used    Alcohol use 0 6 oz/week     1 Glasses of wine per week        Review of Systems   All other systems reviewed and are negative        Physical Exam  ED Triage Vitals   Temperature Pulse Respirations Blood Pressure SpO2   02/25/18 1842 02/25/18 1842 02/25/18 1842 02/25/18 1842 02/25/18 1842   (!) 100 7 °F (38 2 °C) 105 18 129/74 96 %      Temp Source Heart Rate Source Patient Position - Orthostatic VS BP Location FiO2 (%)   02/25/18 1842 02/25/18 1842 02/25/18 1842 02/25/18 1842 --   Tympanic Monitor Sitting Right arm       Pain Score       02/25/18 1942       5           Orthostatic Vital Signs  Vitals:    02/25/18 1842   BP: 129/74   Pulse: 105   Patient Position - Orthostatic VS: Sitting       Physical Exam   Constitutional: She is oriented to person, place, and time  She appears well-developed and well-nourished  HENT:   Head: Normocephalic and atraumatic  Eyes: EOM are normal  Pupils are equal, round, and reactive to light  Neck: Normal range of motion  Neck supple  Cardiovascular: Normal rate and regular rhythm  Pulmonary/Chest: Effort normal and breath sounds normal    Abdominal: Soft  Bowel sounds are normal    Musculoskeletal: Normal range of motion  Neurological: She is alert and oriented to person, place, and time  Skin: Skin is warm and dry  Psychiatric: She has a normal mood and affect  Nursing note and vitals reviewed  ED Medications  Medications   ipratropium-albuterol (DUO-NEB) 0 5-2 5 mg/3 mL inhalation solution 3 mL (3 mL Nebulization Not Given 2/25/18 2016)   acetaminophen (TYLENOL) tablet 650 mg (650 mg Oral Given 2/25/18 1942)       Diagnostic Studies  Results Reviewed     Procedure Component Value Units Date/Time    Rapid Beta strep screen [[id number]]  (Normal) Collected:  02/25/18 2007    Lab Status:  Final result Specimen:  Throat Updated:  02/25/18 2008     Rapid Strep A Screen Negative    Throat culture [[id number]] Collected:  02/25/18 2007    Lab Status: In process Specimen:  Throat Updated:  02/25/18 2008                 XR chest 2 views    (Results Pending)              Procedures  Procedures       Phone Contacts  ED Phone Contact    ED Course  ED Course                                MDM  Number of Diagnoses or Management Options  Diagnosis management comments: Patient evaluated with chest x-ray and a strep screen  Both of which are unremarkable at this time  Discussed the findings with the patient  Discharged her on steroids and albuterol inhaler and cough medicine  I recommended she return to the ER for any worsening symptoms    And to follow up with her family doctor  She verbalized understanding of instructions and had no further questions at discharge  Amount and/or Complexity of Data Reviewed  Clinical lab tests: ordered and reviewed  Tests in the radiology section of CPT®: ordered and reviewed  Tests in the medicine section of CPT®: reviewed and ordered    Patient Progress  Patient progress: stable    CritCare Time    Disposition  Final diagnoses:   Bronchitis   Influenza     Time reflects when diagnosis was documented in both MDM as applicable and the Disposition within this note     Time User Action Codes Description Comment    2/25/2018  8:16 PM Chu Phillip Add [J40] Bronchitis     2/25/2018  8:16 PM Jeaneth Phillip Add [J11 1] Influenza       ED Disposition     ED Disposition Condition Comment    Discharge  [facility] discharge to home/self care      Condition at discharge: Stable        Follow-up Information     Follow up With Specialties Details Why Contact Info Additional Information    Donavan Carson MD Family Medicine Schedule an appointment as soon as possible for a visit  [address] [facility] [address]  [address] [facility] Emergency Department Emergency Medicine  If symptoms worsen [address] [facility], [place], [place], [address]        Patient's Medications   Discharge Prescriptions    ALBUTEROL (PROVENTIL HFA,VENTOLIN HFA) 90 MCG/ACT INHALER    Inhale 2 puffs every 4 (four) hours as needed for wheezing       Start Date: 2/25/2018 End Date: --       Order Dose: 2 puffs       Quantity: 1 Inhaler    Refills: 0    BENZONATATE (TESSALON PERLES) 100 MG CAPSULE    Take 1 capsule (100 mg total) by mouth every 8 (eight) hours for 3 days       Start Date: 2/25/2018 End Date: 2/28/2018       Order Dose: 100 mg       Quantity: 12 capsule    Refills: 0    PREDNISONE 20 MG TABLET    Take 2 tablets (40 mg total) by mouth daily for 5 days       Start Date: 2/25/2018 End Date: 3/2/2018       Order Dose: 40 mg       Quantity: 10 tablet    Refills: 0    PSEUDOEPHEDRINE-CODEINE-GUAIFENESIN (MYTUSSIN DAC) 30- MG/5ML SOLUTION    Take 5 mL by mouth 4 (four) times a day as needed for allergies for up to 3 days       Start Date: 2/25/2018 End Date: 2/28/2018       Order Dose: 5 mL       Quantity: 120 mL    Refills: 0     No discharge procedures on file      ED Provider  Electronically Signed by           Lissette Ignacio DO  02/25/18 [place]

## 2018-02-27 LAB — BACTERIA THROAT CULT: NORMAL

## 2018-03-08 ENCOUNTER — TELEPHONE (OUTPATIENT)
Dept: GASTROENTEROLOGY | Facility: CLINIC | Age: 53
End: 2018-03-08

## 2018-03-08 NOTE — TELEPHONE ENCOUNTER
I CALLED PT LEFT MESSAGE FOR PT TO CALL ME BACK RE:OPEN ACCESS PROCEDURE W/ DR Sanders Necessary AT [facility]

## 2018-03-08 NOTE — TELEPHONE ENCOUNTER
Eledia   1965  [address] [facility]  [phone]  Cell Phone [phone]    Screened by: Vincent Castillo  ]    Referring Dr : Michela Vallecillo     Pre- Screening:   Has patient been referred for a routine screening Colonoscopy? yes  Is the patient over 48years of age? yes    If the answer is YES to both questions, proceed to the medical questions  Do you have any of the following symptoms? NO    Have you had a coronary or vascular stent within the last year? no    Have you had a heart attack or stroke in the last 6 months? no    Have you had intestinal surgery in the last 3 months? no    Do you have problems with:anesthesia  no    Do you use:  Oxygen no  CPAP/BiPAP no    Have you been hospitalized in the last Month? no    Have you been diagnosed with a bleeding disorder or anemia? no    Have you had chest pain (angina) or breathing problems  (COPD) in the last 3 months? no     Do you have any difficulty walking up a flight of stairs? no    Have you had Kidney failure or insufficiency? no    Have you had heart valve surgery? no    Are you confined to a wheelchair? no    Do you take Other blood thinners NO  no    Do you take insulin for Diabetes no  Name of medication:    : If patient answers NO to medical questions, then schedule procedure  If patient answers YES to medical questions, then schedule office appointment  Previous Colonoscopy no   (if yes) Date and Facility of last colonoscopy? Patient scheduled for procedure:   Scheduled by:    Time:   Provider:   Location:     Insurance: BCBS  Referral Required? Were instructions Mailed? [place] to [facility]:   Was the prep sent to Pharmacy?    Comments: [DR Maricel Keith  ]

## 2018-03-09 DIAGNOSIS — Z12.11 COLON CANCER SCREENING: Primary | ICD-10-CM

## 2018-03-09 NOTE — TELEPHONE ENCOUNTER
I SCHEDULED PT FOR OPEN ACCESS COLONOSCOPY ON 4/9/2018 W/ DR Tasia Arcos  PREP TASKED/INSTRUCTIONS MAILED

## 2018-03-15 ENCOUNTER — EVALUATION (OUTPATIENT)
Dept: PHYSICAL THERAPY | Facility: CLINIC | Age: 53
End: 2018-03-15
Payer: COMMERCIAL

## 2018-03-15 DIAGNOSIS — S42.024D CLOSED NONDISPLACED FRACTURE OF SHAFT OF RIGHT CLAVICLE WITH ROUTINE HEALING, SUBSEQUENT ENCOUNTER: Primary | ICD-10-CM

## 2018-03-15 DIAGNOSIS — M25.511 ACUTE PAIN OF RIGHT SHOULDER: ICD-10-CM

## 2018-03-15 PROCEDURE — 97161 PT EVAL LOW COMPLEX 20 MIN: CPT | Performed by: PHYSICAL THERAPIST

## 2018-03-15 PROCEDURE — G8985 CARRY GOAL STATUS: HCPCS | Performed by: PHYSICAL THERAPIST

## 2018-03-15 PROCEDURE — G8984 CARRY CURRENT STATUS: HCPCS | Performed by: PHYSICAL THERAPIST

## 2018-03-15 NOTE — LETTER
2018    Yogesh De Anda MD  [address] [address]    Patient: Kacy Hoover   YOB: 1965   Date of Visit: 3/15/2018     Encounter Diagnosis     ICD-10-CM    1  Closed nondisplaced fracture of shaft of right clavicle with routine healing, subsequent encounter S42 024D    2  Acute pain of right shoulder M25 511        Dear Dr Radha Hall:    Please review the attached Plan of Care from JOHNIE CHU recent visit  Please verify that you agree therapy should continue by signing the attached document and sending it back to our office  If you have any questions or concerns, please don't hesitate to call  Sincerely,    Vasyl Chow, PT      Referring Provider:      I certify that I have read the below Plan of Care and certify the need for these services furnished under this plan of treatment while under my care  Yogesh De Anda MD  [address]: [phone]          PT Evaluation     Today's date: 3/15/2018  Patient name: Kacy Hoover  : 1965  MRN: [id number]  Referring provider: Jennifer Gann MD  Dx:   Encounter Diagnosis     ICD-10-CM    1  Closed nondisplaced fracture of shaft of right clavicle with routine healing, subsequent encounter S42 024D    2  Acute pain of right shoulder M25 511                   Assessment  Impairments: abnormal or restricted ROM, activity intolerance, impaired physical strength, lacks appropriate home exercise program and pain with function    Assessment details: Hazeline Marcia Malaretpresents with signs and symptoms consistent with Closed nondisplaced fracture of shaft of right clavicle with routine healing, subsequent encounter  (primary encounter diagnosis)  Acute pain of right shoulder, with loss of range of motion, strength and spinal stabilization  Presents with medium reactivity    Kacy Hoover would benefit with physical therapy to address these impairments to return to prior level of function  Understanding of Dx/Px/POC: excellent  Goals  STG  Initiate HEP  Able to reach overhead without pain in 3 weeks  LTG  Independent with HEP in 6 weeks  Able to lift 10 lbs overhead pain free in 6 weeks    Plan  Planned therapy interventions: joint mobilization, manual therapy, neuromuscular re-education, strengthening, stretching, therapeutic exercise and home exercise program  Frequency: 2x week  Duration in visits: 12  Duration in weeks: 6  Treatment plan discussed with: patient        Subjective Evaluation    History of Present Illness  Date of onset: 2018  Mechanism of injury: trauma  Mechanism of injury: Pulled over by a dog, and landed on the right collar bone    Not a recurrent problem   Quality of life: excellent    Pain  Current pain ratin  At best pain ratin  At worst pain ratin  Quality: dull ache and knife-like  Relieving factors: rest  Aggravating factors: overhead activity  Progression: improved    Social Support  Steps to enter house: yes  Stairs in house: yes   Lives in: multiple-level home  Lives with: spouse    Employment status: working  Hand dominance: right      Diagnostic Tests  X-ray: abnormal  Treatments  Current treatment: physical therapy  Patient Goals  Patient goals for therapy: decreased pain, increased motion, increased strength and independence with ADLs/IADLs          Objective     Active Range of Motion   Left Shoulder   Flexion: 180 degrees   Abduction: 180 degrees   External rotation 0°:  90 degrees   Internal rotation 0°:  70 degrees     Right Shoulder   Flexion: 80 degrees with pain  Abduction: 60 degrees with pain  External rotation 0°:  45 degrees with pain  Internal rotation 0°:  45 degrees with pain    Passive Range of Motion   Left Shoulder   Normal passive range of motion    Right Shoulder   Flexion: 90 degrees   Abduction: 70 degrees   External rotation 0°:  50 degrees   Internal rotation 0°:  55 degrees Strength/Myotome Testing     Left Shoulder     Planes of Motion   Flexion: 5   Abduction: 5   External rotation at 0°:  5   Internal rotation at 0°:  5     Right Shoulder     Planes of Motion   Flexion: 3-   Abduction: 3-   External rotation at 0°:  3-   Internal rotation at 0°:  3-     Precautions None    Specialty Daily Treatment Diary     Manual  3/15       PROM 10 min                                           Exercise Diary  3/15       Supine punch outs 10x       Table top slide 10x                                                                                                                                                           Modalities

## 2018-03-15 NOTE — PROGRESS NOTES
PT Evaluation     Today's date: 3/15/2018  Patient name: Stephon Howard  : 1965  MRN: [id number]  Referring provider: Nina Bowling MD  Dx:   Encounter Diagnosis     ICD-10-CM    1  Closed nondisplaced fracture of shaft of right clavicle with routine healing, subsequent encounter S42 024D    2  Acute pain of right shoulder [id number] [id number]                   Assessment  Impairments: abnormal or restricted ROM, activity intolerance, impaired physical strength, lacks appropriate home exercise program and pain with function    Assessment details: Jeaneth Valderrama Malaretpresents with signs and symptoms consistent with Closed nondisplaced fracture of shaft of right clavicle with routine healing, subsequent encounter  (primary encounter diagnosis)  Acute pain of right shoulder, with loss of range of motion, strength and spinal stabilization  Presents with medium reactivity  Stephon Howard would benefit with physical therapy to address these impairments to return to prior level of function  Understanding of Dx/Px/POC: excellent  Goals  STG  Initiate HEP  Able to reach overhead without pain in 3 weeks  LTG  Independent with HEP in 6 weeks  Able to lift 10 lbs overhead pain free in 6 weeks    Plan  Planned therapy interventions: joint mobilization, manual therapy, neuromuscular re-education, strengthening, stretching, therapeutic exercise and home exercise program  Frequency: 2x week  Duration in visits: 12  Duration in weeks: 6  Treatment plan discussed with: patient        Subjective Evaluation    History of Present Illness  Date of onset: 2018  Mechanism of injury: trauma  Mechanism of injury: Pulled over by a dog, and landed on the right collar bone    Not a recurrent problem   Quality of life: excellent    Pain  Current pain ratin  At best pain ratin  At worst pain ratin  Quality: dull ache and knife-like  Relieving factors: rest  Aggravating factors: overhead activity  Progression: improved    Social Support  Steps to enter house: yes  Stairs in house: yes   Lives in: multiple-level home  Lives with: spouse    Employment status: working  Hand dominance: right      Diagnostic Tests  X-ray: abnormal  Treatments  Current treatment: physical therapy  Patient Goals  Patient goals for therapy: decreased pain, increased motion, increased strength and independence with ADLs/IADLs          Objective     Active Range of Motion   Left Shoulder   Flexion: 180 degrees   Abduction: 180 degrees   External rotation 0°: 90 degrees   Internal rotation 0°: 70 degrees     Right Shoulder   Flexion: 80 degrees with pain  Abduction: 60 degrees with pain  External rotation 0°: 45 degrees with pain  Internal rotation 0°: 45 degrees with pain    Passive Range of Motion   Left Shoulder   Normal passive range of motion    Right Shoulder   Flexion: 90 degrees   Abduction: 70 degrees   External rotation 0°: 50 degrees   Internal rotation 0°: 55 degrees     Strength/Myotome Testing     Left Shoulder     Planes of Motion   Flexion: 5   Abduction: 5   External rotation at 0°: 5   Internal rotation at 0°: 5     Right Shoulder     Planes of Motion   Flexion: 3-   Abduction: 3-   External rotation at 0°: 3-   Internal rotation at 0°: 3-     Precautions None    Specialty Daily Treatment Diary     Manual  3/15       PROM 10 min                                           Exercise Diary  3/15       Supine punch outs 10x       Table top slide 10x                                                                                                                                                           Modalities

## 2018-03-20 ENCOUNTER — OFFICE VISIT (OUTPATIENT)
Dept: PHYSICAL THERAPY | Facility: CLINIC | Age: 53
End: 2018-03-20
Payer: COMMERCIAL

## 2018-03-20 DIAGNOSIS — S42.024D CLOSED NONDISPLACED FRACTURE OF SHAFT OF RIGHT CLAVICLE WITH ROUTINE HEALING, SUBSEQUENT ENCOUNTER: Primary | ICD-10-CM

## 2018-03-20 PROCEDURE — 97110 THERAPEUTIC EXERCISES: CPT | Performed by: PHYSICAL THERAPIST

## 2018-03-20 PROCEDURE — 97140 MANUAL THERAPY 1/> REGIONS: CPT | Performed by: PHYSICAL THERAPIST

## 2018-03-20 NOTE — PROGRESS NOTES
Daily Note     Today's date: 3/20/2018  Patient name: Malgorzata Knox  : 1965  MRN: [id number]  Referring provider: Yash Billings MD  Dx:   Encounter Diagnosis     ICD-10-CM    1  Closed nondisplaced fracture of shaft of right clavicle with routine healing, subsequent encounter S42 024D                   Subjective: Slight soreness, but moving better  Objective: See treatment diary below      Assessment: Tolerated treatment well  Patient exhibited good technique with therapeutic exercises      Plan: Continue per plan of care        Precautions None    Specialty Daily Treatment Diary     Manual  3/15 3/20      PROM 10 min 10 min                                          Exercise Diary  3/15 3/20      Supine punch outs 10x       Table top slide 10x       Nustep  10  Min  L1      Supine cane Flex  20x      Supine Cane ER  20x      Standing Cane EXT  20 x                                                                                                                          Modalities  3/20      Cp  performed

## 2018-03-22 ENCOUNTER — OFFICE VISIT (OUTPATIENT)
Dept: PHYSICAL THERAPY | Facility: CLINIC | Age: 53
End: 2018-03-22
Payer: COMMERCIAL

## 2018-03-22 DIAGNOSIS — S42.024D CLOSED NONDISPLACED FRACTURE OF SHAFT OF RIGHT CLAVICLE WITH ROUTINE HEALING, SUBSEQUENT ENCOUNTER: Primary | ICD-10-CM

## 2018-03-22 PROCEDURE — 97110 THERAPEUTIC EXERCISES: CPT

## 2018-03-22 PROCEDURE — 97140 MANUAL THERAPY 1/> REGIONS: CPT

## 2018-03-22 NOTE — PROGRESS NOTES
Daily Note     Today's date: 3/22/2018  Patient name: Jose Keller  : 1965  MRN: [id number]  Referring provider: Shena Soler MD  Dx:   Encounter Diagnosis     ICD-10-CM    1  Closed nondisplaced fracture of shaft of right clavicle with routine healing, subsequent encounter S42 024D                   Subjective: Feeling better  Objective: See treatment diary below    Specialty Daily Treatment Diary     Manual  3/15 3/20 3/22     PROM 10 min 10 min R shoulder                                         Exercise Diary  3/15 3/20 3/22     Supine punch outs 10x  hep     Table top slide 10x  hep     Nustep  10  Min  L1 10 min     Supine cane Flex  20x 10x     Supine Cane ER  20x 10x     Standing Cane EXT  20 x 20x      Cane IR stand   20x     TB row  Ext, add   Red 3 x10                                                                                                         Modalities  3/20      Cp  performed                          Assessment: Tolerated treatment well  Patient would benefit from continued PT      Plan: Progress treatment as tolerated

## 2018-03-26 ENCOUNTER — APPOINTMENT (OUTPATIENT)
Dept: PHYSICAL THERAPY | Facility: CLINIC | Age: 53
End: 2018-03-26
Payer: COMMERCIAL

## 2018-03-27 ENCOUNTER — OFFICE VISIT (OUTPATIENT)
Dept: PHYSICAL THERAPY | Facility: CLINIC | Age: 53
End: 2018-03-27
Payer: COMMERCIAL

## 2018-03-27 DIAGNOSIS — S42.024D CLOSED NONDISPLACED FRACTURE OF SHAFT OF RIGHT CLAVICLE WITH ROUTINE HEALING, SUBSEQUENT ENCOUNTER: Primary | ICD-10-CM

## 2018-03-27 PROCEDURE — 97140 MANUAL THERAPY 1/> REGIONS: CPT

## 2018-03-27 PROCEDURE — 97110 THERAPEUTIC EXERCISES: CPT

## 2018-03-27 NOTE — PROGRESS NOTES
Daily Note     Today's date: 3/27/2018  Patient name: Trang Castano  : 1965  MRN: [id number]  Referring provider: Ghassan Cummings MD  Dx:   Encounter Diagnosis     ICD-10-CM    1  Closed nondisplaced fracture of shaft of right clavicle with routine healing, subsequent encounter S42 024D                   Subjective: Feeling much better  Objective: See treatment diary below    Specialty Daily Treatment Diary     Manual  3/15 3/20 3/22 3/27    PROM 10 min 10 min R shoulder R shoulder    STM    R UT    isometrics    IR/ER                        Exercise Diary  3/15 3/20 3/22 3/27    Supine punch outs 10x  hep 2# x 20    Table top slide 10x  hep hep    Nustep  10  Min  L1 10 min 10 min L1    Supine cane Flex  20x 10x ---    Supine Cane ER  20x 10x ----    Standing Cane EXT  20 x 20x  Cane EXT / IR x20 reps    Cane IR stand   20x ------    TB row  Ext, add   Red 3 x10 greenTB row, ext, add, IR/ER  x 20 reps each    Prone row & ext     3# x 20 reps each    IR/ER using ball    2x10                                                                                        Modalities  3/20      Cp  performed                            Assessment: Tolerated treatment well  Patient would benefit from continued PT      Plan: Progress treatment as tolerated

## 2018-03-29 ENCOUNTER — APPOINTMENT (OUTPATIENT)
Dept: PHYSICAL THERAPY | Facility: CLINIC | Age: 53
End: 2018-03-29
Payer: COMMERCIAL

## 2018-03-30 ENCOUNTER — OFFICE VISIT (OUTPATIENT)
Dept: PHYSICAL THERAPY | Facility: CLINIC | Age: 53
End: 2018-03-30
Payer: COMMERCIAL

## 2018-03-30 DIAGNOSIS — S42.024D CLOSED NONDISPLACED FRACTURE OF SHAFT OF RIGHT CLAVICLE WITH ROUTINE HEALING, SUBSEQUENT ENCOUNTER: Primary | ICD-10-CM

## 2018-03-30 PROCEDURE — 97110 THERAPEUTIC EXERCISES: CPT | Performed by: PHYSICAL THERAPIST

## 2018-03-30 PROCEDURE — 97112 NEUROMUSCULAR REEDUCATION: CPT | Performed by: PHYSICAL THERAPIST

## 2018-03-30 PROCEDURE — 97140 MANUAL THERAPY 1/> REGIONS: CPT | Performed by: PHYSICAL THERAPIST

## 2018-03-30 NOTE — PROGRESS NOTES
Daily Note     Today's date: 3/30/2018  Patient name: Lisa Laws  : 1965  MRN: [id number]  Referring provider: Dick Adorno MD  Dx:   Encounter Diagnosis     ICD-10-CM    1  Closed nondisplaced fracture of shaft of right clavicle with routine healing, subsequent encounter S42 024D                   Subjective: I am getting better  Objective: See treatment diary below      Assessment: Tolerated treatment well  Patient exhibited good technique with therapeutic exercises      Plan: Continue per plan of care  Objective: See treatment diary below    Specialty Daily Treatment Diary     Manual  3/15 3/20 3/22 3/27 3/30   PROM 10 min 10 min R shoulder R shoulder r sh   STM    R UT    isometrics    IR/ER                        Exercise Diary  3/15 3/20 3/22 3/27 3/30   Supine punch outs 10x  hep 2# x 20 x20   Table top slide 10x  hep hep    Nustep  10  Min  L1 10 min 10 min L1 10 min   Supine cane Flex  20x 10x --- 20x   Supine Cane ER  20x 10x ---- 20x   Standing Cane EXT  20 x 20x  Cane EXT / IR x20 reps 20x   Cane IR stand   20x ------ 20x   TB row   Ext, add   Red 3 x10 greenTB row, ext, add, IR/ER  x 20 reps each 20x   Prone row & ext     3# x 20 reps each 20x   IR/ER using ball    2x10 20x   SDLY PNF sh clock     5x                                                                               Modalities  3/20      Cp  performed

## 2018-04-02 ENCOUNTER — APPOINTMENT (OUTPATIENT)
Dept: PHYSICAL THERAPY | Facility: CLINIC | Age: 53
End: 2018-04-02
Payer: COMMERCIAL

## 2018-04-02 ENCOUNTER — OFFICE VISIT (OUTPATIENT)
Dept: PHYSICAL THERAPY | Facility: CLINIC | Age: 53
End: 2018-04-02
Payer: COMMERCIAL

## 2018-04-02 DIAGNOSIS — S42.024D CLOSED NONDISPLACED FRACTURE OF SHAFT OF RIGHT CLAVICLE WITH ROUTINE HEALING, SUBSEQUENT ENCOUNTER: Primary | ICD-10-CM

## 2018-04-02 PROCEDURE — 97110 THERAPEUTIC EXERCISES: CPT

## 2018-04-02 PROCEDURE — G8986 CARRY D/C STATUS: HCPCS

## 2018-04-02 PROCEDURE — G8985 CARRY GOAL STATUS: HCPCS

## 2018-04-02 NOTE — PROGRESS NOTES
Daily Note     Today's date: 2018  Patient name: Felisha Anderson  : 1965  MRN: [id number]  Referring provider: Adriana Tyler MD  Dx:   Encounter Diagnosis     ICD-10-CM    1  Closed nondisplaced fracture of shaft of right clavicle with routine healing, subsequent encounter S42 024D                   Subjective: I am feeling much better  I see the Dr next week  Objective: See treatment diary below      Specialty Daily Treatment Diary     Manual                                                     Exercise Diary         Supine punch outs 2# x20       Table top slide hep       Nustep 10 min L2       Supine cane Flex ----       Supine & S/L ER 2# x 20 each       Standing Cane EXT/IR 20x               5 way TB row, Ext, IR/ER,add Green x 20 each       Prone row & ext  3# x20 each       IR/ER using ball 2x10                                                                                           Modalities                                  Assessment: Tolerated treatment well  Patient exhibited good technique with therapeutic exercises   HEP reviewed with patient , with daily performance encouraged         Plan: D/C with HEP

## 2018-04-05 ENCOUNTER — APPOINTMENT (OUTPATIENT)
Dept: PHYSICAL THERAPY | Facility: CLINIC | Age: 53
End: 2018-04-05
Payer: COMMERCIAL

## 2018-04-05 NOTE — PRE-PROCEDURE INSTRUCTIONS
Pre-Surgery Instructions:   Medication Instructions    Na Sulfate-K Sulfate-Mg Sulf 17 5-3 13-1 6 GM/180ML SOLN Patient was instructed by Physician and understands  Pt to follow Dr Mariah Madera instructions     Bryan Campo [phone]

## 2018-04-09 ENCOUNTER — APPOINTMENT (OUTPATIENT)
Dept: PHYSICAL THERAPY | Facility: CLINIC | Age: 53
End: 2018-04-09
Payer: COMMERCIAL

## 2018-04-09 ENCOUNTER — HOSPITAL ENCOUNTER (OUTPATIENT)
Facility: AMBULARY SURGERY CENTER | Age: 53
Setting detail: OUTPATIENT SURGERY
Discharge: HOME | End: 2018-04-09
Attending: INTERNAL MEDICINE | Admitting: INTERNAL MEDICINE
Payer: COMMERCIAL

## 2018-04-09 ENCOUNTER — ANESTHESIA (OUTPATIENT)
Dept: GASTROENTEROLOGY | Facility: AMBULARY SURGERY CENTER | Age: 53
End: 2018-04-09
Payer: COMMERCIAL

## 2018-04-09 VITALS
TEMPERATURE: 97.6 F | SYSTOLIC BLOOD PRESSURE: 97 MMHG | WEIGHT: 117 LBS | HEIGHT: 63 IN | HEART RATE: 87 BPM | DIASTOLIC BLOOD PRESSURE: 52 MMHG | BODY MASS INDEX: 20.73 KG/M2 | OXYGEN SATURATION: 100 % | RESPIRATION RATE: 18 BRPM

## 2018-04-09 PROCEDURE — G0121 COLON CA SCRN NOT HI RSK IND: HCPCS | Performed by: INTERNAL MEDICINE

## 2018-04-09 RX ORDER — SODIUM CHLORIDE 9 MG/ML
50 INJECTION, SOLUTION INTRAVENOUS CONTINUOUS
Status: DISCONTINUED | OUTPATIENT
Start: 2018-04-09 | End: 2018-04-09 | Stop reason: HOSPADM

## 2018-04-09 RX ORDER — SODIUM CHLORIDE 9 MG/ML
INJECTION, SOLUTION INTRAVENOUS CONTINUOUS PRN
Status: DISCONTINUED | OUTPATIENT
Start: 2018-04-09 | End: 2018-04-09 | Stop reason: SURG

## 2018-04-09 RX ORDER — PROPOFOL 10 MG/ML
INJECTION, EMULSION INTRAVENOUS AS NEEDED
Status: DISCONTINUED | OUTPATIENT
Start: 2018-04-09 | End: 2018-04-09 | Stop reason: SURG

## 2018-04-09 RX ORDER — LIDOCAINE HYDROCHLORIDE 10 MG/ML
INJECTION, SOLUTION INFILTRATION; PERINEURAL AS NEEDED
Status: DISCONTINUED | OUTPATIENT
Start: 2018-04-09 | End: 2018-04-09 | Stop reason: SURG

## 2018-04-09 RX ORDER — PROPOFOL 10 MG/ML
INJECTION, EMULSION INTRAVENOUS CONTINUOUS PRN
Status: DISCONTINUED | OUTPATIENT
Start: 2018-04-09 | End: 2018-04-09 | Stop reason: SURG

## 2018-04-09 RX ADMIN — LIDOCAINE HYDROCHLORIDE 50 MG: 10 INJECTION, SOLUTION INFILTRATION; PERINEURAL at 12:27

## 2018-04-09 RX ADMIN — PROPOFOL 60 MG: 10 INJECTION, EMULSION INTRAVENOUS at 12:27

## 2018-04-09 RX ADMIN — PROPOFOL 100 MCG/KG/MIN: 10 INJECTION, EMULSION INTRAVENOUS at 12:27

## 2018-04-09 RX ADMIN — SODIUM CHLORIDE: 0.9 INJECTION, SOLUTION INTRAVENOUS at 12:27

## 2018-04-09 NOTE — OP NOTE
Colonoscopy Procedure Note    Procedure: Colonoscopy    Sedation: Monitored anesthesia care, check anesthesia records      ASA Class: 2    INDICATIONS: Screening for Colon Cancer    POST-OP DIAGNOSIS: See the impression below    Procedure Details     Prior colonoscopy: No prior colonoscopy  Informed consent was obtained for the procedure, including sedation  Risks of perforation, hemorrhage, adverse drug reaction and aspiration were discussed  The patient was placed in the left lateral decubitus position  Based on the pre-procedure assessment, including review of the patient's medical history, medications, allergies, and review of systems, she had been deemed to be an appropriate candidate for conscious sedation; she was therefore sedated with the medications listed below  The patient was monitored continuously with telemetry, pulse oximetry, blood pressure monitoring, and direct observations  A rectal examination was performed  The variable-stiffness pediatric colonoscope was inserted into the rectum and advanced under direct vision to the cecum, which was identified by the ileocecal valve and appendiceal orifice  The quality of the colonic preparation was good  A careful inspection was made as the colonoscope was withdrawn, including a retroflexed view of the rectum; findings and interventions are described below  Findings:    Tortuous rectosigmoid but otherwise normal colonoscopy with good prep good visualization  Mild internal hemorrhoids on retroflexion           Complications:  None; patient tolerated the procedure well  Impression:      Mildly tortuous rectosigmoid, and mild internal hemorrhoids    Recommendations:  Repeat colonoscopy in 10 years or sooner if clinically indicated      Discharge home  Resume regular diet  Resume home medications  Repeat colonoscopy in 10 years  Call with any abdominal pain, bleeding, fevers

## 2018-04-09 NOTE — H&P
History and Physical - [facility]  Edwardo Salcedo 47 y o  female MRN: [id number]    HPI: Edwardo Salcedo is a 47y o  year old female who presents for screening colonoscopy  Review of Systems    Historical Information   Past Medical History:   Diagnosis Date    Fracture     collar bone r side    Thyroid nodule     currently too small to do a bx-watching    Vertigo     from stress     Past Surgical History:   Procedure Laterality Date    BREAST SURGERY Right     bx-benign    TUBAL LIGATION       Social History   History   Alcohol Use    0 6 oz/week    1 Glasses of wine per week     Comment: daily     History   Drug Use No     History   Smoking Status    Never Smoker   Smokeless Tobacco    Never Used     Family History   Problem Relation Age of Onset    Cancer Mother      thyroid    Heart disease Sister 25     open heart-valve replaced-d/t strep throat       Meds/Allergies     Prescriptions Prior to Admission   Medication    Na Sulfate-K Sulfate-Mg Sulf 17 5-3 13-1 6 GM/180ML SOLN       No Known Allergies    Objective     Blood pressure 101/57, pulse 80, temperature 97 6 °F (36 4 °C), temperature source Tympanic, resp  rate 16, height 5' 3" (1 6 m), weight 53 1 kg (117 lb), SpO2 97 %, not currently breastfeeding  PHYSICAL EXAM    Gen: NAD  CV: RRR  CHEST: Clear  ABD: soft, NT/ND  EXT: no edema  Neuro: AAO      ASSESSMENT/PLAN:  This is a 47y o  year old female here for screening colonoscopy       PLAN:   Procedure: colonoscopy

## 2018-04-09 NOTE — ANESTHESIA PREPROCEDURE EVALUATION
Review of Systems/Medical History      No history of anesthetic complications     Cardiovascular  Exercise tolerance: good,  No angina , No CARVAJAL,    Pulmonary  Negative pulmonary ROS        GI/Hepatic    Bowel prep            Endo/Other  History of thyroid disease ,      GYN       Hematology   Musculoskeletal       Neurology   Psychology           Physical Exam    Airway    Mallampati score: I  TM Distance: >3 FB  Neck ROM: full     Dental       Cardiovascular  Cardiovascular exam normal    Pulmonary  Pulmonary exam normal     Other Findings        Anesthesia Plan  ASA Score- 1     Anesthesia Type- IV sedation with anesthesia with ASA Monitors  Additional Monitors:   Airway Plan:         Plan Factors-    Induction- intravenous  Postoperative Plan-     Informed Consent- Anesthetic plan and risks discussed with patient

## 2018-04-12 ENCOUNTER — APPOINTMENT (OUTPATIENT)
Dept: PHYSICAL THERAPY | Facility: CLINIC | Age: 53
End: 2018-04-12
Payer: COMMERCIAL

## 2018-04-16 ENCOUNTER — APPOINTMENT (OUTPATIENT)
Dept: PHYSICAL THERAPY | Facility: CLINIC | Age: 53
End: 2018-04-16
Payer: COMMERCIAL

## 2018-04-17 ENCOUNTER — TRANSCRIBE ORDERS (OUTPATIENT)
Dept: PHYSICAL THERAPY | Facility: CLINIC | Age: 53
End: 2018-04-17

## 2018-04-17 DIAGNOSIS — S42.024D CLOSED NONDISPLACED FRACTURE OF SHAFT OF RIGHT CLAVICLE WITH ROUTINE HEALING, SUBSEQUENT ENCOUNTER: Primary | ICD-10-CM

## 2018-04-19 ENCOUNTER — APPOINTMENT (OUTPATIENT)
Dept: PHYSICAL THERAPY | Facility: CLINIC | Age: 53
End: 2018-04-19
Payer: COMMERCIAL

## 2018-08-22 PROBLEM — E06.3 HASHIMOTO'S THYROIDITIS: Status: ACTIVE | Noted: 2017-04-14

## 2018-09-05 ENCOUNTER — OFFICE VISIT (OUTPATIENT)
Dept: FAMILY MEDICINE CLINIC | Facility: CLINIC | Age: 53
End: 2018-09-05
Payer: COMMERCIAL

## 2018-09-05 VITALS
RESPIRATION RATE: 16 BRPM | TEMPERATURE: 98 F | WEIGHT: 122 LBS | HEIGHT: 63 IN | OXYGEN SATURATION: 98 % | HEART RATE: 80 BPM | SYSTOLIC BLOOD PRESSURE: 110 MMHG | DIASTOLIC BLOOD PRESSURE: 70 MMHG | BODY MASS INDEX: 21.62 KG/M2

## 2018-09-05 DIAGNOSIS — L70.9 ACNE, UNSPECIFIED ACNE TYPE: ICD-10-CM

## 2018-09-05 DIAGNOSIS — R05.9 COUGH: Primary | ICD-10-CM

## 2018-09-05 DIAGNOSIS — E06.3 HASHIMOTO'S THYROIDITIS: ICD-10-CM

## 2018-09-05 DIAGNOSIS — M25.50 ARTHRALGIA, UNSPECIFIED JOINT: ICD-10-CM

## 2018-09-05 DIAGNOSIS — J45.20 MILD INTERMITTENT REACTIVE AIRWAY DISEASE WITHOUT COMPLICATION: ICD-10-CM

## 2018-09-05 DIAGNOSIS — K21.9 GASTROESOPHAGEAL REFLUX DISEASE WITHOUT ESOPHAGITIS: ICD-10-CM

## 2018-09-05 DIAGNOSIS — Z91.09 ENVIRONMENTAL ALLERGIES: ICD-10-CM

## 2018-09-05 PROCEDURE — 1036F TOBACCO NON-USER: CPT | Performed by: FAMILY MEDICINE

## 2018-09-05 PROCEDURE — 94010 BREATHING CAPACITY TEST: CPT | Performed by: FAMILY MEDICINE

## 2018-09-05 PROCEDURE — 99214 OFFICE O/P EST MOD 30 MIN: CPT | Performed by: FAMILY MEDICINE

## 2018-09-05 PROCEDURE — 3008F BODY MASS INDEX DOCD: CPT | Performed by: FAMILY MEDICINE

## 2018-09-05 RX ORDER — MONTELUKAST SODIUM 10 MG/1
10 TABLET ORAL
Qty: 30 TABLET | Refills: 1 | Status: SHIPPED | OUTPATIENT
Start: 2018-09-05 | End: 2021-01-29 | Stop reason: HOSPADM

## 2018-09-05 RX ORDER — ERYTHROMYCIN AND BENZOYL PEROXIDE 30; 50 MG/G; MG/G
GEL TOPICAL 2 TIMES DAILY
Qty: 46.6 G | Refills: 3 | Status: SHIPPED | OUTPATIENT
Start: 2018-09-05 | End: 2021-01-29 | Stop reason: HOSPADM

## 2018-09-09 NOTE — PROGRESS NOTES
Assessment/Plan:     Diagnoses and all orders for this visit:    Cough  -     POCT spirometry  -     XR chest pa & lateral; Future    Mild intermittent reactive airway disease without complication  -     montelukast (SINGULAIR) 10 mg tablet; Take 1 tablet (10 mg total) by mouth daily at bedtime  -     XR chest pa & lateral; Future    Environmental allergies  -     montelukast (SINGULAIR) 10 mg tablet; Take 1 tablet (10 mg total) by mouth daily at bedtime  -     CBC and Platelet; Future    Acne, unspecified acne type  -     benzoyl peroxide-erythromycin (BENZAMYCIN) gel; Apply topically 2 (two) times a day As needed    Arthralgia, unspecified joint  -     Comprehensive metabolic panel; Future  -     Lyme Antibody Profile with reflex to WB; Future  -     Babesia microti antibody, IgG & Igm; Future    Gastroesophageal reflux disease without esophagitis  -     Lipid panel; Future  -     Comprehensive metabolic panel; Future  -     Amylase; Future  -     Lipase; Future            Subjective:      Patient ID: Jessika Martin is a 47 y o  female  Chief Complaint   Patient presents with    Cough    Hand Problem     raised area to right hand        Cough   This is a recurrent problem  The problem has been waxing and waning  The cough is non-productive  Associated symptoms include postnasal drip, rhinorrhea and a sore throat  Pertinent negatives include no fever, hemoptysis, shortness of breath or wheezing  The treatment provided no relief  Her past medical history is significant for environmental allergies  non-smoker  ? occ GERD  +environ  allergies, no rash or fever  +/-joint pains and +lesion right palm--no trauma    The following portions of the patient's history were reviewed and updated as appropriate: allergies, current medications, past family history, past medical history, past social history, past surgical history and problem list      Review of Systems   Constitutional: Positive for fatigue   Negative for fever    HENT: Positive for postnasal drip, rhinorrhea and sore throat  Respiratory: Positive for cough  Negative for hemoptysis, shortness of breath and wheezing  Gastrointestinal: Negative for blood in stool  ? ?GERD   Endocrine:        Thyroid d/o   Musculoskeletal: Positive for arthralgias  Allergic/Immunologic: Positive for environmental allergies  Neurological: Negative  Psychiatric/Behavioral: Positive for sleep disturbance  The patient is nervous/anxious  Objective:    /70 (BP Location: Left arm, Patient Position: Sitting, Cuff Size: Standard)   Pulse 80   Temp 98 °F (36 7 °C) (Tympanic)   Resp 16   Ht 5' 3" (1 6 m)   Wt 55 3 kg (122 lb)   SpO2 98%   BMI 21 61 kg/m²        Physical Exam   Constitutional: She is oriented to person, place, and time  She appears well-developed and well-nourished  No distress  HENT:   Mouth/Throat: Oropharyngeal exudate present  Nasal boginess   Eyes: Conjunctivae are normal    Neck: Neck supple  Cardiovascular: Normal rate, regular rhythm, normal heart sounds and intact distal pulses  Pulmonary/Chest: Effort normal and breath sounds normal  No respiratory distress  She has no wheezes  Abdominal: Soft  Bowel sounds are normal  There is no tenderness  Neurological: She is alert and oriented to person, place, and time  No cranial nerve deficit  Skin: Skin is warm and dry  No rash noted  +subcutneous lesion,sl tender lesion right palm  FROM right hand/fingers-opposes all digits w/o difficulty   Psychiatric: She has a normal mood and affect  Nursing note and vitals reviewed        Ramón Soria MD

## 2019-02-18 ENCOUNTER — TRANSITIONAL CARE MANAGEMENT (OUTPATIENT)
Dept: FAMILY MEDICINE CLINIC | Facility: CLINIC | Age: 54
End: 2019-02-18

## 2020-03-13 ENCOUNTER — OFFICE VISIT (OUTPATIENT)
Dept: FAMILY MEDICINE CLINIC | Facility: CLINIC | Age: 55
End: 2020-03-13
Payer: COMMERCIAL

## 2020-03-13 VITALS
WEIGHT: 127 LBS | HEART RATE: 80 BPM | OXYGEN SATURATION: 98 % | DIASTOLIC BLOOD PRESSURE: 70 MMHG | HEIGHT: 63 IN | BODY MASS INDEX: 22.5 KG/M2 | TEMPERATURE: 97.8 F | SYSTOLIC BLOOD PRESSURE: 122 MMHG | RESPIRATION RATE: 20 BRPM

## 2020-03-13 DIAGNOSIS — J06.9 ACUTE URI: Primary | ICD-10-CM

## 2020-03-13 PROCEDURE — 3008F BODY MASS INDEX DOCD: CPT | Performed by: FAMILY MEDICINE

## 2020-03-13 PROCEDURE — 99213 OFFICE O/P EST LOW 20 MIN: CPT | Performed by: FAMILY MEDICINE

## 2020-03-13 PROCEDURE — 1036F TOBACCO NON-USER: CPT | Performed by: FAMILY MEDICINE

## 2020-03-13 RX ORDER — DEXTROMETHORPHAN HYDROBROMIDE AND PROMETHAZINE HYDROCHLORIDE 15; 6.25 MG/5ML; MG/5ML
5 SOLUTION ORAL 4 TIMES DAILY PRN
Qty: 240 ML | Refills: 0 | Status: SHIPPED | OUTPATIENT
Start: 2020-03-13 | End: 2021-01-29 | Stop reason: HOSPADM

## 2020-03-13 RX ORDER — AZITHROMYCIN 250 MG/1
TABLET, FILM COATED ORAL
Qty: 6 TABLET | Refills: 0 | Status: SHIPPED | OUTPATIENT
Start: 2020-03-13 | End: 2020-03-18

## 2020-03-13 NOTE — PROGRESS NOTES
Assessment/Plan:     Diagnoses and all orders for this visit:    Acute URI  -     azithromycin (Zithromax) 250 mg tablet; Take 2 tablets (500 mg total) by mouth daily for 1 day, THEN 1 tablet (250 mg total) daily for 4 days   -     Promethazine-DM (PHENERGAN-DM) 6 25-15 mg/5 mL oral syrup; Take 5 mL by mouth 4 (four) times a day as needed for cough  - Reviewed supportive care with patient including rest, staying hydrated (drink 8-10 glasses of liquids such as water, ginger ale, juice), salt water gargles, saline nasal drops, use of humidifier, frequent handwashing to prevent spread of germs, and use of over the counter decongestants, cough suppressants, Ibuprofen/Acetaminophen        Subjective:      Patient ID: Silvia Javed is a 53 y o  female  HPI  Delilah Whitfield presents today for 2 day hx of non productive cough, fatigues, back pain, congestion, post nasal drip, headaches  Denies fevers, chills, dizziness  No sick contacts or recent travel  Works in a middle school  The following portions of the patient's history were reviewed and updated as appropriate: allergies, current medications, past family history, past medical history, past social history, past surgical history and problem list     Review of Systems   Constitutional: Positive for activity change and fatigue  Negative for appetite change, chills, diaphoresis and fever  HENT: Positive for congestion and postnasal drip  Respiratory: Positive for cough  Negative for choking, chest tightness, shortness of breath and wheezing  Cardiovascular: Negative for chest pain, palpitations and leg swelling  Gastrointestinal: Negative for abdominal distention, abdominal pain, constipation, diarrhea, nausea and vomiting  Genitourinary: Negative for difficulty urinating, dyspareunia, dysuria, enuresis, flank pain, frequency, menstrual problem, pelvic pain and urgency  Musculoskeletal: Positive for back pain   Negative for arthralgias, gait problem, joint swelling, myalgias, neck pain and neck stiffness  Skin: Negative  Neurological: Positive for headaches  Negative for dizziness, tremors, syncope, facial asymmetry, weakness, light-headedness and numbness  Psychiatric/Behavioral: Negative  Objective:      /70   Pulse 80   Temp 97 8 °F (36 6 °C)   Resp 20   Ht 5' 3" (1 6 m)   Wt 57 6 kg (127 lb)   SpO2 98%   BMI 22 50 kg/m²          Physical Exam   Constitutional: She is oriented to person, place, and time  She appears well-developed and well-nourished  No distress  HENT:   Head: Normocephalic and atraumatic  Right Ear: External ear normal    Left Ear: External ear normal    Nose: Nose normal    Mouth/Throat: Oropharynx is clear and moist    Eyes: Right eye exhibits no discharge  Left eye exhibits no discharge  No scleral icterus  Neck: Normal range of motion  Neck supple  Cardiovascular: Normal rate, regular rhythm, normal heart sounds and intact distal pulses  Exam reveals no gallop and no friction rub  No murmur heard  Pulmonary/Chest: Effort normal and breath sounds normal  No respiratory distress  She has no wheezes  She has no rales  She exhibits no tenderness  Musculoskeletal: Normal range of motion  She exhibits no edema or deformity  Lymphadenopathy:     She has no cervical adenopathy  Neurological: She is alert and oriented to person, place, and time  Skin: Skin is warm and dry  She is not diaphoretic  Psychiatric: She has a normal mood and affect   Her behavior is normal  Judgment and thought content normal

## 2020-03-13 NOTE — LETTER
March 13, 2020     Patient: Herve Sandoval   YOB: 1965   Date of Visit: 3/13/2020       To Whom it May Concern:    Herve Sandoval is under my professional care  She was seen in my office on 3/13/2020  She may return to work on 3/13/2020  If you have any questions or concerns, please don't hesitate to call           Sincerely,          Rochelle Vaughn MD        CC: No Recipients

## 2020-07-14 ENCOUNTER — TELEMEDICINE (OUTPATIENT)
Dept: FAMILY MEDICINE CLINIC | Facility: CLINIC | Age: 55
End: 2020-07-14
Payer: COMMERCIAL

## 2020-07-14 DIAGNOSIS — Z20.822 ENCOUNTER FOR LABORATORY TESTING FOR COVID-19 VIRUS: Primary | ICD-10-CM

## 2020-07-14 DIAGNOSIS — S96.912A MUSCLE STRAIN OF LEFT FOOT, INITIAL ENCOUNTER: ICD-10-CM

## 2020-07-14 PROCEDURE — 99214 OFFICE O/P EST MOD 30 MIN: CPT | Performed by: FAMILY MEDICINE
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
## 2020-07-14 NOTE — ASSESSMENT & PLAN NOTE
If you choose to have a COVID-19 antibody test, you should understand some important limitations of this test   Antibody tests detect the body's immune response to infection rather than detecting the virus itself  It can take weeks for antibodies to show up in the blood  For this reason, antibody tests are not used to detect or manage infection  They may be better for tracking infections in the community  Current antibody tests have not undergone the usual strict FDA approval process  The FDA decided to make it easier to have more tests available  The result is that these new tests may not be reliable  Since COVID-19 antibody testing is so new, we do not know how accurate it is  You can have a positive test and have not actually been infected  You also can have a negative test even though you might have been infected  There are other coronaviruses that are known to cause the common cold  Many people may have antibodies to these other viruses that could make the COVID-19 antibody test positive  More importantly, even if you test positive, this does not necessarily mean you are immune to the virus  Even so, your [facility]'s provider understands that you may still want a COVID-19 antibody test and can order this test for you  It is important that you review the results with your provider and decide together how to use them

## 2020-07-16 PROBLEM — S96.912A MUSCLE STRAIN OF LEFT FOOT: Status: ACTIVE | Noted: 2020-07-16

## 2020-07-16 PROBLEM — R59.0 MEDIASTINAL ADENOPATHY: Status: ACTIVE | Noted: 2020-07-16

## 2020-11-04 ENCOUNTER — VBI (OUTPATIENT)
Dept: ADMINISTRATIVE | Facility: OTHER | Age: 55
End: 2020-11-04

## 2021-01-29 ENCOUNTER — APPOINTMENT (OUTPATIENT)
Dept: RADIOLOGY | Facility: HOSPITAL | Age: 56
End: 2021-01-29
Payer: COMMERCIAL

## 2021-01-29 ENCOUNTER — HOSPITAL ENCOUNTER (OUTPATIENT)
Facility: HOSPITAL | Age: 56
Setting detail: OBSERVATION
Discharge: HOME | End: 2021-01-29
Attending: EMERGENCY MEDICINE | Admitting: FAMILY MEDICINE
Payer: COMMERCIAL

## 2021-01-29 ENCOUNTER — APPOINTMENT (EMERGENCY)
Dept: RADIOLOGY | Facility: HOSPITAL | Age: 56
End: 2021-01-29
Payer: COMMERCIAL

## 2021-01-29 VITALS
WEIGHT: 115.08 LBS | RESPIRATION RATE: 16 BRPM | HEART RATE: 81 BPM | HEIGHT: 63 IN | SYSTOLIC BLOOD PRESSURE: 110 MMHG | TEMPERATURE: 97.6 F | BODY MASS INDEX: 20.39 KG/M2 | OXYGEN SATURATION: 98 % | DIASTOLIC BLOOD PRESSURE: 93 MMHG

## 2021-01-29 DIAGNOSIS — G51.4 FACIAL TWITCHING: Primary | ICD-10-CM

## 2021-01-29 DIAGNOSIS — G51.4 FACIAL TWITCHING: ICD-10-CM

## 2021-01-29 DIAGNOSIS — G45.9 TIA (TRANSIENT ISCHEMIC ATTACK): Primary | ICD-10-CM

## 2021-01-29 LAB
ALBUMIN SERPL BCP-MCNC: 3.7 G/DL (ref 3.5–5)
ALP SERPL-CCNC: 34 U/L (ref 46–116)
ALT SERPL W P-5'-P-CCNC: 35 U/L (ref 12–78)
ANION GAP SERPL CALCULATED.3IONS-SCNC: 8 MMOL/L (ref 4–13)
AST SERPL W P-5'-P-CCNC: 17 U/L (ref 5–45)
ATRIAL RATE: 91 BPM
BASOPHILS # BLD AUTO: 0.02 THOUSANDS/ΜL (ref 0–0.1)
BASOPHILS NFR BLD AUTO: 0 % (ref 0–1)
BILIRUB SERPL-MCNC: 0.4 MG/DL (ref 0.2–1)
BUN SERPL-MCNC: 10 MG/DL (ref 5–25)
CALCIUM SERPL-MCNC: 8.9 MG/DL (ref 8.3–10.1)
CHLORIDE SERPL-SCNC: 102 MMOL/L (ref 100–108)
CO2 SERPL-SCNC: 29 MMOL/L (ref 21–32)
CREAT SERPL-MCNC: 0.74 MG/DL (ref 0.6–1.3)
EOSINOPHIL # BLD AUTO: 0.04 THOUSAND/ΜL (ref 0–0.61)
EOSINOPHIL NFR BLD AUTO: 1 % (ref 0–6)
ERYTHROCYTE [DISTWIDTH] IN BLOOD BY AUTOMATED COUNT: 12 % (ref 11.6–15.1)
GFR SERPL CREATININE-BSD FRML MDRD: 91 ML/MIN/1.73SQ M
GLUCOSE SERPL-MCNC: 94 MG/DL (ref 65–140)
HCT VFR BLD AUTO: 44 % (ref 34.8–46.1)
HGB BLD-MCNC: 14.3 G/DL (ref 11.5–15.4)
IMM GRANULOCYTES # BLD AUTO: 0.01 THOUSAND/UL (ref 0–0.2)
IMM GRANULOCYTES NFR BLD AUTO: 0 % (ref 0–2)
LYMPHOCYTES # BLD AUTO: 1.25 THOUSANDS/ΜL (ref 0.6–4.47)
LYMPHOCYTES NFR BLD AUTO: 22 % (ref 14–44)
MAGNESIUM SERPL-MCNC: 2.2 MG/DL (ref 1.6–2.6)
MCH RBC QN AUTO: 29.9 PG (ref 26.8–34.3)
MCHC RBC AUTO-ENTMCNC: 32.5 G/DL (ref 31.4–37.4)
MCV RBC AUTO: 92 FL (ref 82–98)
MONOCYTES # BLD AUTO: 0.4 THOUSAND/ΜL (ref 0.17–1.22)
MONOCYTES NFR BLD AUTO: 7 % (ref 4–12)
NEUTROPHILS # BLD AUTO: 3.87 THOUSANDS/ΜL (ref 1.85–7.62)
NEUTS SEG NFR BLD AUTO: 70 % (ref 43–75)
NRBC BLD AUTO-RTO: 0 /100 WBCS
P AXIS: 75 DEGREES
PLATELET # BLD AUTO: 224 THOUSANDS/UL (ref 149–390)
PMV BLD AUTO: 10 FL (ref 8.9–12.7)
POTASSIUM SERPL-SCNC: 3.6 MMOL/L (ref 3.5–5.3)
PR INTERVAL: 136 MS
PROT SERPL-MCNC: 7.4 G/DL (ref 6.4–8.2)
QRS AXIS: 71 DEGREES
QRSD INTERVAL: 82 MS
QT INTERVAL: 388 MS
QTC INTERVAL: 477 MS
RBC # BLD AUTO: 4.78 MILLION/UL (ref 3.81–5.12)
SODIUM SERPL-SCNC: 139 MMOL/L (ref 136–145)
T WAVE AXIS: 48 DEGREES
TROPONIN I SERPL-MCNC: <0.02 NG/ML
VENTRICULAR RATE: 91 BPM
WBC # BLD AUTO: 5.59 THOUSAND/UL (ref 4.31–10.16)

## 2021-01-29 PROCEDURE — 70498 CT ANGIOGRAPHY NECK: CPT

## 2021-01-29 PROCEDURE — 70496 CT ANGIOGRAPHY HEAD: CPT

## 2021-01-29 PROCEDURE — 70450 CT HEAD/BRAIN W/O DYE: CPT

## 2021-01-29 PROCEDURE — G1004 CDSM NDSC: HCPCS

## 2021-01-29 PROCEDURE — 93005 ELECTROCARDIOGRAM TRACING: CPT

## 2021-01-29 PROCEDURE — 99285 EMERGENCY DEPT VISIT HI MDM: CPT | Performed by: EMERGENCY MEDICINE

## 2021-01-29 PROCEDURE — 84484 ASSAY OF TROPONIN QUANT: CPT | Performed by: EMERGENCY MEDICINE

## 2021-01-29 PROCEDURE — 83735 ASSAY OF MAGNESIUM: CPT | Performed by: EMERGENCY MEDICINE

## 2021-01-29 PROCEDURE — 36415 COLL VENOUS BLD VENIPUNCTURE: CPT | Performed by: EMERGENCY MEDICINE

## 2021-01-29 PROCEDURE — 70551 MRI BRAIN STEM W/O DYE: CPT

## 2021-01-29 PROCEDURE — 99245 OFF/OP CONSLTJ NEW/EST HI 55: CPT | Performed by: PSYCHIATRY & NEUROLOGY

## 2021-01-29 PROCEDURE — 85025 COMPLETE CBC W/AUTO DIFF WBC: CPT | Performed by: EMERGENCY MEDICINE

## 2021-01-29 PROCEDURE — 99235 HOSP IP/OBS SAME DATE MOD 70: CPT | Performed by: FAMILY MEDICINE

## 2021-01-29 PROCEDURE — 99285 EMERGENCY DEPT VISIT HI MDM: CPT

## 2021-01-29 PROCEDURE — 80053 COMPREHEN METABOLIC PANEL: CPT | Performed by: EMERGENCY MEDICINE

## 2021-01-29 PROCEDURE — 93010 ELECTROCARDIOGRAM REPORT: CPT | Performed by: INTERNAL MEDICINE

## 2021-01-29 RX ORDER — ALENDRONATE SODIUM 70 MG/1
70 TABLET ORAL
COMMUNITY
End: 2021-03-30 | Stop reason: SDUPTHER

## 2021-01-29 RX ORDER — ASPIRIN 81 MG/1
324 TABLET, CHEWABLE ORAL ONCE
Status: COMPLETED | OUTPATIENT
Start: 2021-01-29 | End: 2021-01-29

## 2021-01-29 RX ORDER — B-COMPLEX WITH VITAMIN C
1 TABLET ORAL
Status: DISCONTINUED | OUTPATIENT
Start: 2021-01-30 | End: 2021-01-29 | Stop reason: HOSPADM

## 2021-01-29 RX ORDER — ASPIRIN 81 MG/1
81 TABLET, CHEWABLE ORAL DAILY
Qty: 30 TABLET | Refills: 0 | Status: SHIPPED | OUTPATIENT
Start: 2021-01-30 | End: 2021-03-30 | Stop reason: SDUPTHER

## 2021-01-29 RX ORDER — ASPIRIN 81 MG/1
81 TABLET, CHEWABLE ORAL DAILY
Status: DISCONTINUED | OUTPATIENT
Start: 2021-01-30 | End: 2021-01-29 | Stop reason: HOSPADM

## 2021-01-29 RX ORDER — B-COMPLEX WITH VITAMIN C
1 TABLET ORAL
COMMUNITY
End: 2021-03-30 | Stop reason: ALTCHOICE

## 2021-01-29 RX ORDER — ATORVASTATIN CALCIUM 80 MG/1
80 TABLET, FILM COATED ORAL
Status: DISCONTINUED | OUTPATIENT
Start: 2021-01-29 | End: 2021-01-29 | Stop reason: HOSPADM

## 2021-01-29 RX ADMIN — ASPIRIN 81 MG CHEWABLE TABLET 324 MG: 81 TABLET CHEWABLE at 14:40

## 2021-01-29 RX ADMIN — IOHEXOL 85 ML: 350 INJECTION, SOLUTION INTRAVENOUS at 14:40

## 2021-01-29 NOTE — PLAN OF CARE
Problem: PAIN - ADULT  Goal: Verbalizes/displays adequate comfort level or baseline comfort level  Description: Interventions:  - Encourage patient to monitor pain and request assistance  - Assess pain using appropriate pain scale  - Administer analgesics based on type and severity of pain and evaluate response  - Implement non-pharmacological measures as appropriate and evaluate response  - Consider cultural and social influences on pain and pain management  - Notify physician/advanced practitioner if interventions unsuccessful or patient reports new pain  Outcome: Progressing     Problem: INFECTION - ADULT  Goal: Absence or prevention of progression during hospitalization  Description: INTERVENTIONS:  - Assess and monitor for signs and symptoms of infection  - Monitor lab/diagnostic results  - Monitor all insertion sites, i e  indwelling lines, tubes, and drains  - Monitor endotracheal if appropriate and nasal secretions for changes in amount and color  - [place] appropriate cooling/warming therapies per order  - Administer medications as ordered  - Instruct and encourage patient and family to use good hand hygiene technique  - Identify and instruct in appropriate isolation precautions for identified infection/condition  Outcome: Progressing  Goal: Absence of fever/infection during neutropenic period  Description: INTERVENTIONS:  - Monitor WBC    Outcome: Progressing     Problem: SAFETY ADULT  Goal: Patient will remain free of falls  Description: INTERVENTIONS:  - Assess patient frequently for physical needs  -  Identify cognitive and physical deficits and behaviors that affect risk of falls    -  Funk fall precautions as indicated by assessment   - Educate patient/family on patient safety including physical limitations  - Instruct patient to call for assistance with activity based on assessment  - Modify environment to reduce risk of injury  - Consider OT/PT consult to assist with strengthening/mobility  Outcome: Progressing  Goal: Maintain or return to baseline ADL function  Description: INTERVENTIONS:  -  Assess patient's ability to carry out ADLs; assess patient's baseline for ADL function and identify physical deficits which impact ability to perform ADLs (bathing, care of mouth/teeth, toileting, grooming, dressing, etc )  - Assess/evaluate cause of self-care deficits   - Assess range of motion  - Assess patient's mobility; develop plan if impaired  - Assess patient's need for assistive devices and provide as appropriate  - Encourage maximum independence but intervene and supervise when necessary  - Involve family in performance of ADLs  - Assess for home care needs following discharge   - Consider OT consult to assist with ADL evaluation and planning for discharge  - Provide patient education as appropriate  Outcome: Progressing  Goal: Maintain or return mobility status to optimal level  Description: INTERVENTIONS:  - Assess patient's baseline mobility status (ambulation, transfers, stairs, etc )    - Identify cognitive and physical deficits and behaviors that affect mobility  - Identify mobility aids required to assist with transfers and/or ambulation (gait belt, sit-to-stand, lift, walker, cane, etc )  - [place] fall precautions as indicated by assessment  - Record patient progress and toleration of activity level on Mobility SBAR; progress patient to next Phase/Stage  - Instruct patient to call for assistance with activity based on assessment  - Consider rehabilitation consult to assist with strengthening/weightbearing, etc   Outcome: Progressing     Problem: DISCHARGE PLANNING  Goal: Discharge to home or other facility with appropriate resources  Description: INTERVENTIONS:  - Identify barriers to discharge w/patient and caregiver  - Arrange for needed discharge resources and transportation as appropriate  - Identify discharge learning needs (meds, wound care, etc )  - Arrange for interpretive services to assist at discharge as needed  - Refer to Case Management Department for coordinating discharge planning if the patient needs post-hospital services based on physician/advanced practitioner order or complex needs related to functional status, cognitive ability, or social support system  Outcome: Progressing     Problem: Knowledge Deficit  Goal: Patient/family/caregiver demonstrates understanding of disease process, treatment plan, medications, and discharge instructions  Description: Complete learning assessment and assess knowledge base    Interventions:  - Provide teaching at level of understanding  - Provide teaching via preferred learning methods  Outcome: Progressing

## 2021-01-29 NOTE — ED PROVIDER NOTES
History  Chief Complaint   Patient presents with    Jaw Pain     Pt c/o episodic L sided jaw pain and muscle spasms and L arm weakness  55yoF c/o L facial numbness/twitching and LUE numbness/heaviness lasting about 45 min at 11:00am   Now at baseline  Convinced by co-workers to come in  No CP/SOB  Non-smoker  No fam hx early CVA  Prior to Admission Medications   Prescriptions Last Dose Informant Patient Reported? Taking? Promethazine-DM (PHENERGAN-DM) 6 25-15 mg/5 mL oral syrup   No No   Sig: Take 5 mL by mouth 4 (four) times a day as needed for cough   Patient not taking: Reported on 7/29/2020   benzoyl peroxide-erythromycin (BENZAMYCIN) gel   No No   Sig: Apply topically 2 (two) times a day As needed   Patient not taking: Reported on 3/13/2020   montelukast (SINGULAIR) 10 mg tablet   No No   Sig: Take 1 tablet (10 mg total) by mouth daily at bedtime   Patient not taking: Reported on 3/13/2020      Facility-Administered Medications: None       Past Medical History:   Diagnosis Date    Fracture     collar bone r side    Mediastinal adenopathy 02/2019    [id number]    Thyroid nodule     currently too small to do a bx-watching    Vertigo     from stress       Past Surgical History:   Procedure Laterality Date    BREAST SURGERY Right     bx-benign    [place] COLONOSCOPY FLX DX W/COLLJ SPEC WHEN PFRMD N/A 4/9/2018    Procedure: COLONOSCOPY;  Surgeon: Ralph Ryan MD;  Location: [place] [address] [facility]; Service: Gastroenterology    TUBAL LIGATION         Family History   Problem Relation Age of Onset    Thyroid cancer Mother     Heart disease Sister 22        open heart-valve replaced-d/t strep throat     I have reviewed and agree with the history as documented  E-Cigarette/Vaping     E-Cigarette/Vaping Substances     Social History     Tobacco Use    Smoking status: Never Smoker    Smokeless tobacco: Never Used   Substance Use Topics    Alcohol use:  Yes     Alcohol/week: 1 0 standard drinks Types: 1 Glasses of wine per week     Frequency: 4 or more times a week     Drinks per session: 1 or 2     Comment: daily    Drug use: No       Review of Systems   Constitutional: Negative for fever  Respiratory: Negative for cough and shortness of breath  Cardiovascular: Negative for chest pain  Gastrointestinal: Negative for abdominal pain  Musculoskeletal: Negative for back pain  Neurological: Negative for headaches  All other systems reviewed and are negative  Physical Exam  Physical Exam  Vitals signs reviewed  Constitutional:       Appearance: She is well-developed  HENT:      Head: Normocephalic and atraumatic  Right Ear: External ear normal       Left Ear: External ear normal       Nose: Nose normal  No rhinorrhea  Mouth/Throat:      Mouth: Mucous membranes are moist    Eyes:      Conjunctiva/sclera: Conjunctivae normal    Neck:      Musculoskeletal: Neck supple  Cardiovascular:      Rate and Rhythm: Normal rate and regular rhythm  Pulmonary:      Effort: Pulmonary effort is normal       Breath sounds: Normal breath sounds  No wheezing or rales  Abdominal:      Palpations: Abdomen is soft  Tenderness: There is no abdominal tenderness  Musculoskeletal:      Right lower leg: No edema  Left lower leg: No edema  Skin:     General: Skin is warm and dry  Neurological:      Mental Status: She is alert and oriented to person, place, and time  Cranial Nerves: No cranial nerve deficit  Sensory: No sensory deficit  Motor: No weakness        Coordination: Coordination normal       Comments: Neg chovstek sign   Psychiatric:         Mood and Affect: Mood normal          Vital Signs  ED Triage Vitals   Temperature Pulse Respirations Blood Pressure SpO2   01/29/21 1258 01/29/21 1220 01/29/21 1220 01/29/21 1230 01/29/21 1220   98 1 °F (36 7 °C) 82 (!) 30 124/68 98 %      Temp Source Heart Rate Source Patient Position - Orthostatic VS BP Location FiO2 (%) 01/29/21 1258 -- 01/29/21 1220 01/29/21 1220 --   Tympanic  Lying Right arm       Pain Score       --                  Vitals:    01/29/21 1220 01/29/21 1230 01/29/21 1245 01/29/21 1300   BP:  124/68 127/69 119/66   Pulse: 82 86 94 84   Patient Position - Orthostatic VS: Lying            Visual Acuity  Visual Acuity      Most Recent Value   L Pupil Size (mm)  1   R Pupil Size (mm)  1          ED Medications  Medications   aspirin chewable tablet 324 mg (has no administration in time range)       Diagnostic Studies  Results Reviewed     Procedure Component Value Units Date/Time    Troponin I [[id number]]  (Normal) Collected: 01/29/21 1258    Lab Status: Final result Specimen: Blood from Arm, Right Updated: 01/29/21 1321     Troponin I <0 02 ng/mL     Comprehensive metabolic panel [[id number]]  (Abnormal) Collected: 01/29/21 1258    Lab Status: Final result Specimen: Blood from Arm, Right Updated: 01/29/21 1318     Sodium 139 mmol/L      Potassium 3 6 mmol/L      Chloride 102 mmol/L      CO2 29 mmol/L      ANION GAP 8 mmol/L      BUN 10 mg/dL      Creatinine 0 74 mg/dL      Glucose 94 mg/dL      Calcium 8 9 mg/dL      AST 17 U/L      ALT 35 U/L      Alkaline Phosphatase 34 U/L      Total Protein 7 4 g/dL      Albumin 3 7 g/dL      Total Bilirubin 0 40 mg/dL      eGFR 91 ml/min/1 73sq m     Narrative:      [place] guidelines for Chronic Kidney Disease (CKD):     Stage 1 with normal or high GFR (GFR > 90 mL/min/1 73 square meters)    Stage 2 Mild CKD (GFR = 60-89 mL/min/1 73 square meters)    Stage 3A Moderate CKD (GFR = 45-59 mL/min/1 73 square meters)    Stage 3B Moderate CKD (GFR = 30-44 mL/min/1 73 square meters)    Stage 4 Severe CKD (GFR = 15-29 mL/min/1 73 square meters)    Stage 5 End Stage CKD (GFR <15 mL/min/1 73 square meters)  Note: GFR calculation is accurate only with a steady state creatinine    Magnesium [[id number]]  (Normal) Collected: 01/29/21 1258    Lab Status: Final result Specimen: Blood from Arm, Right Updated: 01/29/21 1318     Magnesium 2 2 mg/dL     CBC and differential [[id number]] Collected: 01/29/21 1258    Lab Status: Final result Specimen: Blood from Arm, Right Updated: 01/29/21 1303     WBC 5 59 Thousand/uL      RBC 4 78 Million/uL      Hemoglobin 14 3 g/dL      Hematocrit 44 0 %      MCV 92 fL      MCH 29 9 pg      MCHC 32 5 g/dL      RDW 12 0 %      MPV 10 0 fL      Platelets 875 Thousands/uL      nRBC 0 /100 WBCs      Neutrophils Relative 70 %      Immat GRANS % 0 %      Lymphocytes Relative 22 %      Monocytes Relative 7 %      Eosinophils Relative 1 %      Basophils Relative 0 %      Neutrophils Absolute 3 87 Thousands/µL      Immature Grans Absolute 0 01 Thousand/uL      Lymphocytes Absolute 1 25 Thousands/µL      Monocytes Absolute 0 40 Thousand/µL      Eosinophils Absolute 0 04 Thousand/µL      Basophils Absolute 0 02 Thousands/µL                  CT head without contrast   Final Result by Amina Hanks DO (01/29 1324)      No acute intracranial abnormality  Workstation performed: [id number]         MRI brain visulase / JULIO    (Results Pending)   CTA head and neck with and without contrast    (Results Pending)              Procedures  Procedures         ED Course                                           MDM  Number of Diagnoses or Management Options  Diagnosis management comments: EKG NSR normal intervals no acute ischemia  CT neg, labs unremarkable  Pt placed on tele obs under Dr Delgado Jean for TIA workup  Dr ACUÑA requests CTA  MRI also ordered from ED for expediency  NIHSS 0           Disposition  Final diagnoses:   TIA (transient ischemic attack)     Time reflects when diagnosis was documented in both MDM as applicable and the Disposition within this note     Time User Action Codes Description Comment    1/29/2021  2:31 PM Claude Martin Add [G45 9] TIA (transient ischemic attack)       ED Disposition     ED Disposition Condition Date/Time Comment    Admit Stable Fri Jan 29, 2021  2:31 PM Case was discussed with Dr Patrick Pedroza and the patient's admission status was agreed to be Admission Status: observation status to the service of Dr Patrick Pedroza  Follow-up Information    None         Patient's Medications   Discharge Prescriptions    No medications on file     No discharge procedures on file      PDMP Review     None          ED Provider  Electronically Signed by           Charlene Liang DO  01/29/21 3819

## 2021-01-29 NOTE — H&P
History and Physical - [facility]    Patient Information: Roro Wall 63 y o  female MRN: [id number]  Unit/Bed#: [address] [facility] 422-01 Encounter: [id number]  Admitting Physician: Christy Vieyra DO  PCP: Quin High MD  Date of Admission:  01/29/21        Hospital Problem List:     Principal Problem:    Facial twitching  Active Problems:    Hashimoto's thyroiditis      Assessment/Plan:    * Facial twitching  Assessment & Plan  Presented with left-sided jaw pain and sensation of facial twitching along with left upper extremity weakness/heaviness lasting about 30 minutes while at work  Patient being admitted to rule out CVA with MRI brain  Place patient on aspirin, high-dose statin  Check A1c and lipid panel in a m  For further evaluation  Neurology consult  Monitor for any recurrence of symptoms    Hashimoto's thyroiditis  Assessment & Plan  Not on any medications          VTE Prophylaxis: Enoxaparin (Lovenox)  / sequential compression device   Code Status: Full code    Anticipated Length of Stay:  Patient will be admitted on an Observation basis with an anticipated length of stay of 1 midnights  Justification for Hospital Stay: fAcial twitching    Total Time for Visit, including Counseling / Coordination of Care: 35   Greater than 50% of this total time spent on direct patient counseling and coordination of care  Chief Complaint:     Jaw Pain (Pt c/o episodic L sided jaw pain and muscle spasms and L arm weakness )    of Present Illness:    Roro Wall is a 63 y o  female who presents with left-sided jaw pain and sensation of facial twitching along with left upper extremity weakness/heaviness lasting about 30 minutes while at work  Symptoms resolved while still at work but was convinced by her coworkers to come in  Patient states that she is supposed to take calcium supplements at home but has not taken it in about 2 weeks       Review of Systems:    Review of Systems   Constitutional: Negative for appetite change, chills and fever  HENT: Negative for congestion and trouble swallowing  Eyes: Negative for photophobia  Respiratory: Negative for chest tightness and shortness of breath  Cardiovascular: Negative for chest pain and leg swelling  Gastrointestinal: Negative for abdominal pain, blood in stool, nausea and vomiting  Genitourinary: Negative for dysuria, frequency and hematuria  Musculoskeletal: Negative for arthralgias  Skin: Negative for wound  Neurological: Positive for weakness and light-headedness  Negative for syncope and speech difficulty  Hematological: Does not bruise/bleed easily  Psychiatric/Behavioral: Negative for agitation  Past Medical and Surgical History:     Past Medical History:   Diagnosis Date    Fracture     collar bone r side    Mediastinal adenopathy 02/2019    [id number]    Thyroid nodule     currently too small to do a bx-watching    Vertigo     from stress       Past Surgical History:   Procedure Laterality Date    BREAST SURGERY Right     bx-benign    CT COLONOSCOPY FLX DX W/COLLJ SPEC WHEN PFRMD N/A 4/9/2018    Procedure: COLONOSCOPY;  Surgeon: Whitney Groves MD;  Location: [facility]; Service: Gastroenterology    TUBAL LIGATION         Meds/Allergies:    PTA meds:   Prior to Admission Medications   Prescriptions Last Dose Informant Patient Reported? Taking?    Promethazine-DM (PHENERGAN-DM) 6 25-15 mg/5 mL oral syrup   No No   Sig: Take 5 mL by mouth 4 (four) times a day as needed for cough   Patient not taking: Reported on 7/29/2020   alendronate (FOSAMAX) 70 mg tablet Past Week at Unknown time  Yes Yes   Sig: Take 70 mg by mouth every 7 days   benzoyl peroxide-erythromycin (BENZAMYCIN) gel   No No   Sig: Apply topically 2 (two) times a day As needed   Patient not taking: Reported on 3/13/2020   calcium carbonate-vitamin D (OSCAL-D) 500 mg-200 units per tablet Not Taking at Unknown time  Yes No   Sig: Take 1 tablet by mouth daily with breakfast   montelukast (SINGULAIR) 10 mg tablet   No No   Sig: Take 1 tablet (10 mg total) by mouth daily at bedtime   Patient not taking: Reported on 3/13/2020      Facility-Administered Medications: None       Allergies: No Known Allergies  History:     Marital Status: /Civil Union     Substance Use History:   Social History     Substance and Sexual Activity   Alcohol Use Yes    Alcohol/week: 1 0 standard drinks    Types: 1 Glasses of wine per week    Frequency: 4 or more times a week    Drinks per session: 1 or 2    Comment: daily     Social History     Tobacco Use   Smoking Status Never Smoker   Smokeless Tobacco Never Used     Social History     Substance and Sexual Activity   Drug Use No       Family History:    Family History   Problem Relation Age of Onset    Thyroid cancer Mother     Heart disease Sister 25        open heart-valve replaced-d/t strep throat       Physical Exam:     Vitals:   Blood Pressure: 110/93 (01/29/21 1540)  Pulse: 81 (01/29/21 1540)  Temperature: 97 6 °F (36 4 °C) (01/29/21 1540)  Temp Source: Oral (01/29/21 1500)  Respirations: 16 (01/29/21 1500)  SpO2: 98 % (01/29/21 1540)    Physical Exam  Constitutional:       General: She is not in acute distress  Appearance: She is not diaphoretic  HENT:      Head: Normocephalic and atraumatic  Eyes:      General:         Right eye: No discharge  Left eye: No discharge  Cardiovascular:      Rate and Rhythm: Normal rate and regular rhythm  Pulmonary:      Effort: Pulmonary effort is normal  No respiratory distress  Breath sounds: Normal breath sounds  No wheezing or rales  Abdominal:      General: Bowel sounds are normal  There is no distension  Palpations: Abdomen is soft  Tenderness: There is no abdominal tenderness  Musculoskeletal:      Right lower leg: No edema  Left lower leg: No edema  Neurological:      General: No focal deficit present        Mental Status: She is alert and oriented to person, place, and time  Cranial Nerves: No cranial nerve deficit  Motor: No weakness  Coordination: Coordination normal                  Lab Results: I have personally reviewed pertinent reports  Results from last 7 days   Lab Units 01/29/21  1258   WBC Thousand/uL 5 59   HEMOGLOBIN g/dL 14 3   HEMATOCRIT % 44 0   PLATELETS Thousands/uL 224   NEUTROS PCT % 70   LYMPHS PCT % 22   MONOS PCT % 7   EOS PCT % 1     Results from last 7 days   Lab Units 01/29/21  1258   POTASSIUM mmol/L 3 6   CHLORIDE mmol/L 102   CO2 mmol/L 29   BUN mg/dL 10   CREATININE mg/dL 0 74   CALCIUM mg/dL 8 9   ALK PHOS U/L 34*   ALT U/L 35   AST U/L 17           Imaging: I have personally reviewed pertinent reports  Cta Head And Neck With And Without Contrast    Result Date: 1/29/2021  Narrative: CTA NECK AND BRAIN WITH AND WITHOUT CONTRAST INDICATION: TIA, initial exam TIA COMPARISON:   None  TECHNIQUE:  Routine CT imaging of the Brain without contrast   Post contrast imaging was performed after administration of iodinated contrast through the neck and brain  Post contrast axial 0 625 mm images timed to opacify the arterial system  3D rendering was performed on an independent workstation  MIP reconstructions performed  Coronal reconstructions were performed of the noncontrast portion of the brain  Radiation dose length product (DLP) for this visit:  317 15 mGy-cm   This examination, like all CT scans performed in the [facility], was performed utilizing techniques to minimize radiation dose exposure, including the use of iterative  reconstruction and automated exposure control  IV Contrast:  85 mL of iohexol (OMNIPAQUE)  IMAGE QUALITY:   Diagnostic FINDINGS: NONCONTRAST BRAIN PARENCHYMA:  No intracranial mass, mass effect or midline shift  No CT signs of acute infarction  No acute parenchymal hemorrhage  VENTRICLES AND EXTRA-AXIAL SPACES:  Normal for the patient's age  VISUALIZED ORBITS AND PARANASAL SINUSES:  Unremarkable  CERVICAL VASCULATURE AORTIC ARCH AND GREAT VESSELS:  Normal aortic arch and great vessel origins  Normal visualized subclavian vessels  RIGHT VERTEBRAL ARTERY CERVICAL SEGMENT:  Normal origin  The vessel is normal in caliber throughout the neck  LEFT VERTEBRAL ARTERY CERVICAL SEGMENT:  Normal origin  The vessel is normal in caliber throughout the neck  RIGHT EXTRACRANIAL CAROTID SEGMENT:  Normal caliber common carotid artery  Normal bifurcation and cervical internal carotid artery  No stenosis or dissection  LEFT EXTRACRANIAL CAROTID SEGMENT:  Normal caliber common carotid artery  Normal bifurcation and cervical internal carotid artery  No stenosis or dissection  NASCET criteria was used to determine the degree of internal carotid artery diameter stenosis  INTRACRANIAL VASCULATURE INTERNAL CAROTID ARTERIES:  Normal enhancement of the intracranial portions of the internal carotid arteries  Normal ophthalmic artery origins  Normal ICA terminus  ANTERIOR CIRCULATION:  Symmetric A1 segments and anterior cerebral arteries with normal enhancement  Normal anterior communicating artery  MIDDLE CEREBRAL ARTERY CIRCULATION:  M1 segment and middle cerebral artery branches demonstrate normal enhancement bilaterally  DISTAL VERTEBRAL ARTERIES:  Normal distal vertebral arteries  Posterior inferior cerebellar artery origins are normal  Normal vertebral basilar junction  BASILAR ARTERY:  Basilar artery is normal in caliber  Normal superior cerebellar arteries  POSTERIOR CEREBRAL ARTERIES: Both posterior cerebral arteries arises from the basilar tip  Both arteries demonstrate normal enhancement  Normal posterior communicating arteries  DURAL VENOUS SINUSES:  Normal  NON VASCULAR ANATOMY BONY STRUCTURES:  Cervical spine degenerative changes causing multilevel neural foraminal narrowing  SOFT TISSUES OF THE NECK:  Normal  THORACIC INLET:  Unremarkable       Impression: No evidence of large vessel occlusion  No evidence of dissection  Workstation performed: [id number]     Ct Head Without Contrast    Result Date: 1/29/2021  Narrative: CT BRAIN - WITHOUT CONTRAST INDICATION:   TIA, initial exam LUE weakness  COMPARISON:  None  TECHNIQUE:  CT examination of the brain was performed  In addition to axial images, sagittal and coronal 2D reformatted images were created and submitted for interpretation  Radiation dose length product (DLP) for this visit:  827 93 mGy-cm   This examination, like all CT scans performed in the [facility], was performed utilizing techniques to minimize radiation dose exposure, including the use of iterative  reconstruction and automated exposure control  IMAGE QUALITY:  Diagnostic  FINDINGS: PARENCHYMA:  No intracranial mass, mass effect or midline shift  No CT signs of acute infarction  No acute parenchymal hemorrhage  VENTRICLES AND EXTRA-AXIAL SPACES:  Normal for the patient's age  VISUALIZED ORBITS AND PARANASAL SINUSES:  Unremarkable  CALVARIUM AND EXTRACRANIAL SOFT TISSUES:  Normal      Impression: No acute intracranial abnormality  Workstation performed: [id number]       CTA head and neck with and without contrast   Final Result      No evidence of large vessel occlusion  No evidence of dissection  Workstation performed: [id number]         CT head without contrast   Final Result      No acute intracranial abnormality  Workstation performed: [id number]         MRI brain visulase / JULIO    (Results Pending)       EKG, Pathology, and Other Studies Reviewed on Admission:   · EKG shows sinus rhythm with no acute ST elevations    Allscripts/EPIC Records Reviewed: Yes     ** Please Note: "This note has been constructed using a voice recognition system  Therefore there may be syntax, spelling, and/or grammatical errors   Please call if you have any questions  "**

## 2021-01-30 NOTE — CONSULTS
[facility]   Neurology Initial Consult    Silvia Javed is a 63 y o  female  [address] [facility] 422/4 [address]-*          Information obtained from:   Chief Complaint   Patient presents with    Jaw Pain     Pt c/o episodic L sided jaw pain and muscle spasms and L arm weakness  Assessment/Plan:    1  Left facial numbness/twitch   2  TIA vs Migraine aura vs focal sensory seizure     Patient's clinical presentation suggests any of above etiology mentioned  Discussed at length that if a second event occurs, then will use appropriate agent  Until then, aspirin 81mg upon discharge  CTA is negative for flow limiting stenosis  lipitor based on LDL  Pending  She can get outpatient echo as suspicion for Annamae Melena is low  Will obtain outpatient EEG  F/u in clinic in 2-3 months  HPI:  Silvia Javed is a 64 yo F with no significant PMH presents with cc of left face numbness  Her symptoms are described as left sided jaw pain with muscle spasm  There was associated numbness and sensation of twitch with it  Denies visual or speech disturbance  She then felt her left upper arm heavy  This lasted about 30 min  She did start getting headache in left frontal region after this  Intensity was mild to moderate  She doesn't have h/o migraines  She is still going through menopause  Denies similar event in past         Past Medical History:   Diagnosis Date    Fracture     collar bone r side    Mediastinal adenopathy 02/2019    [id number]    Thyroid nodule     currently too small to do a bx-watching    Vertigo     from stress       Past Surgical History:   Procedure Laterality Date    BREAST SURGERY Right     bx-benign    TN COLONOSCOPY FLX DX W/COLLJ SPEC WHEN PFRMD N/A 4/9/2018    Procedure: COLONOSCOPY;  Surgeon: Cristina Weeks MD;  Location: [facility] GI LAB;   Service: Gastroenterology    TUBAL LIGATION         No Known Allergies      Current Facility-Administered Medications:     [START ON 1/30/2021] aspirin chewable tablet 81 mg, 81 mg, Oral, Daily, Zulma Revankar, DO    atorvastatin (LIPITOR) tablet 80 mg, 80 mg, Oral, Daily With Dinner, Zulma Forbesankar, DO    [START ON 1/30/2021] calcium carbonate-vitamin D (OSCAL-D) 500 mg-200 units per tablet 1 tablet, 1 tablet, Oral, Daily With Breakfast, Zulma Revankar, DO    [START ON 1/30/2021] enoxaparin (LOVENOX) subcutaneous injection 40 mg, 40 mg, Subcutaneous, Daily, Zulma Revankar, DO    Social History     Socioeconomic History    Marital status: /Civil Union     Spouse name: Not on file    Number of children: Not on file    Years of education: Not on file    Highest education level: Not on file   Occupational History    Not on file   Social Needs    Financial resource strain: Not on file    Food insecurity     Worry: Not on file     Inability: Not on file   Bundle It needs     Medical: Not on file     Non-medical: Not on file   Tobacco Use    Smoking status: Never Smoker    Smokeless tobacco: Never Used   Substance and Sexual Activity    Alcohol use:  Yes     Alcohol/week: 1 0 standard drinks     Types: 1 Glasses of wine per week     Frequency: 4 or more times a week     Drinks per session: 1 or 2     Comment: daily    Drug use: No    Sexual activity: Not on file   Lifestyle    Physical activity     Days per week: Not on file     Minutes per session: Not on file    Stress: Not on file   Relationships    Social connections     Talks on phone: Not on file     Gets together: Not on file     Attends Gnosticism service: Not on file     Active member of club or organization: Not on file     Attends meetings of clubs or organizations: Not on file     Relationship status: Not on file    Intimate partner violence     Fear of current or ex partner: Not on file     Emotionally abused: Not on file     Physically abused: Not on file     Forced sexual activity: Not on file   Other Topics Concern    Not on file   Social History Narrative Caffeine use- 1 cup of coffee per day       Family History   Problem Relation Age of Onset    Thyroid cancer Mother     Heart disease Sister 22        open heart-valve replaced-d/t strep throat         Review of systems:  Please see HPI for positive symptoms  No fever, no chills, no weight change  Ocular: No drainage, no blurred vision  HEENT:  No sore throat, earache, or congestion  No neck pain  COR:  No chest pain  No palpitations  Lungs:  no sob, wheezing,  GI:  no  nausea, no vomiting, no diarrhea, no constipation, no anorexia  :  No dysuria, frequency, or urgency  No hematuria  Musculoskeletal:  No joint pain or swelling or edema  Skin:  No rash or itching  Psychiatric:  no anxiety, no depression  Endocrine:  No polyuria or polydipsia  Physical examination:  Vitals:    01/29/21 1540   BP: 110/93   Pulse: 81   Resp:    Temp: 97 6 °F (36 4 °C)   SpO2: 98%       GENERAL APPEARANCE:  The patient is alert, oriented  He is in no acute distress  HEENT:  Head is normocephalic  The sinuses are otherwise nontender  Pupils are equal and reactive  NECK:  Supple without lymphadenopathy  HEART:  Regular rate and rhythm  LUNGS:  clear to auscultation  No crackles or wheezes are heard  ABDOMEN:  Soft, nontender, nondistended with good bowel sounds heard  EXTREMITIES:  Without cyanosis, clubbing or edema  Mental status: The patient is alert, attentive, and oriented  Speech is clear and fluent, good repetition, comprehension, and naming  he recalls 3/3 objects at 5 minutes  Cranial nerves:  CN II: Visual fields are full to confrontation  Fundoscopic exam is normal with sharp discs and no vascular changes    Pupils are 3 mm and briskly reactive to light  CN III, IV, VI: At primary gaze, there is no eye deviation  CN V: Facial sensation is intact to pinprick in all 3 divisions bilaterally  Corneal responses are intact  CN VII: Face is symmetric with normal eye closure and smile    CN VIII: Hearing is normal to rubbing fingers  CN IX, X: Palate elevates symmetrically  Phonation is normal   CN XI: Head turning and shoulder shrug are intact  CN XII: Tongue is midline with normal movements and no atrophy  Motor: There is no pronator drift of out-stretched arms  Muscle bulk and tone are normal      Muscle exam  Arm Right Left Leg Right Left   Deltoid 5/5 5/5 Iliopsoas 5/5 5/5   Biceps 5/5 5/5 Quads 5/5 5/5   Triceps 5/5 5/5 Hamstrings 5/5 5/5   Wrist Extension 5/5 5/5 Ankle Dorsi Flexion 5/5 5/5   Wrist Flexion 5/5 5/5 Ankle Plantar Flexion 5/5 5/5   Interossei 5/5 5/5 Ankle Eversion 5/5 5/5   APB 5/5 5/5 Ankle Inversion 5/5 5/5       Reflexes   RJ BJ TJ KJ AJ Plantars Staley's   Right 2+ 2+ 2+ 2+ 2+ Downgoing Not present   Left 2+ 2+ 2+ 2+ 2+ Downgoing Not present     Sensory:  Light touch, pinprick, position sense, and vibration sense are intact in fingers and toes  Coordination:  Rapid alternating movements and fine finger movements are intact  There is no dysmetria on finger-to-nose and heel-knee-shin  There are no abnormal or extraneous movements  Romberg negative  Gait/Stance:  Normal heel/toe walking and tandem gait  Lab Results   Component Value Date    WBC 5 59 01/29/2021    HGB 14 3 01/29/2021    HCT 44 0 01/29/2021    MCV 92 01/29/2021     01/29/2021     No results found for: HGBA1C  Lab Results   Component Value Date    ALT 35 01/29/2021    AST 17 01/29/2021    ALKPHOS 34 (L) 01/29/2021     Lab Results   Component Value Date    CALCIUM 8 9 01/29/2021    K 3 6 01/29/2021    CO2 29 01/29/2021     01/29/2021    BUN 10 01/29/2021    CREATININE 0 74 01/29/2021         Radiology          Review of reports and Independent Interpretation of images or specimens:  Cta Head And Neck With And Without Contrast    Result Date: 1/29/2021  No evidence of large vessel occlusion  No evidence of dissection   Workstation performed: [id number]     Ct Head Without Contrast    Result Date: 1/29/2021  No acute intracranial abnormality  Workstation performed: [id number]     Mri Brain Wo Contrast    Result Date: 1/29/2021  No MR evidence of acute ischemia  Workstation performed: [id number]               Thank you for this consult  Total time of encounter: 70 min  More than 50% of time was spent in counseling and coordination of care of patient  DIEGO Crawford    [facility]  [address]  [address]

## 2021-01-30 NOTE — ASSESSMENT & PLAN NOTE
Presented with left-sided jaw pain and sensation of facial twitching along with left upper extremity weakness/heaviness lasting about 30 minutes while at work  MRI brain negative for CVA  As per Neurology presenting symptoms could be TIA versus focal seizure versus migraine aura  Per neurology baby aspirin on discharge  Check A1c and lipid panel in a m   With PCP For further evaluation and possible initiation of Lipitor based on lipid panel  Follow-up with Neurology after discharge for outpatient EEG  Echo as outpatient

## 2021-01-30 NOTE — DISCHARGE SUMMARY
Discharge Summary - [facility]    Patient Information: Niya Ireland 63 y o  female MRN: [id number]  Unit/Bed#: 71427 [facility] 422- Encounter: [id number]    Discharging Physician / Practitioner: Johnny Dakin, DO  PCP: Issa Draper MD  Admission Date: 1/29/2021  Discharge Date: 01/29/21    Reason for Admission: Jaw Pain (Pt c/o episodic L sided jaw pain and muscle spasms and L arm weakness )      Discharge Diagnoses:     Principal Problem (Resolved):    Facial twitching  Active Problems:    Hashimoto's thyroiditis        * Facial twitchingresolved as of 1/29/2021  Assessment & Plan  Presented with left-sided jaw pain and sensation of facial twitching along with left upper extremity weakness/heaviness lasting about 30 minutes while at work  MRI brain negative for CVA  As per Neurology presenting symptoms could be TIA versus focal seizure versus migraine aura  Per neurology baby aspirin on discharge  Check A1c and lipid panel in a m  With PCP For further evaluation and possible initiation of Lipitor based on lipid panel  Follow-up with Neurology after discharge for outpatient EEG  Echo as outpatient    Hashimoto's thyroiditis  Assessment & Plan  Not on any medications      Consultations During Hospital Stay:  [address] [place] [address]    Procedures Performed:     · none    Significant Findings:     · Refer to hospital course and above listed diagnosis related plan for details    Imaging while in hospital:    Cta Head And Neck With And Without Contrast    Result Date: 1/29/2021  Narrative: CTA NECK AND BRAIN WITH AND WITHOUT CONTRAST INDICATION: TIA, initial exam TIA COMPARISON:   None  TECHNIQUE:  Routine CT imaging of the Brain without contrast   Post contrast imaging was performed after administration of iodinated contrast through the neck and brain  Post contrast axial 0 625 mm images timed to opacify the arterial system    3D rendering was performed on an independent workstation  MIP reconstructions performed  Coronal reconstructions were performed of the noncontrast portion of the brain  Radiation dose length product (DLP) for this visit:  317 15 mGy-cm   This examination, like all CT scans performed in the [facility], was performed utilizing techniques to minimize radiation dose exposure, including the use of iterative  reconstruction and automated exposure control  IV Contrast:  85 mL of iohexol (OMNIPAQUE)  IMAGE QUALITY:   Diagnostic FINDINGS: NONCONTRAST BRAIN PARENCHYMA:  No intracranial mass, mass effect or midline shift  No CT signs of acute infarction  No acute parenchymal hemorrhage  VENTRICLES AND EXTRA-AXIAL SPACES:  Normal for the patient's age  VISUALIZED ORBITS AND PARANASAL SINUSES:  Unremarkable  CERVICAL VASCULATURE AORTIC ARCH AND GREAT VESSELS:  Normal aortic arch and great vessel origins  Normal visualized subclavian vessels  RIGHT VERTEBRAL ARTERY CERVICAL SEGMENT:  Normal origin  The vessel is normal in caliber throughout the neck  LEFT VERTEBRAL ARTERY CERVICAL SEGMENT:  Normal origin  The vessel is normal in caliber throughout the neck  RIGHT EXTRACRANIAL CAROTID SEGMENT:  Normal caliber common carotid artery  Normal bifurcation and cervical internal carotid artery  No stenosis or dissection  LEFT EXTRACRANIAL CAROTID SEGMENT:  Normal caliber common carotid artery  Normal bifurcation and cervical internal carotid artery  No stenosis or dissection  NASCET criteria was used to determine the degree of internal carotid artery diameter stenosis  INTRACRANIAL VASCULATURE INTERNAL CAROTID ARTERIES:  Normal enhancement of the intracranial portions of the internal carotid arteries  Normal ophthalmic artery origins  Normal ICA terminus  ANTERIOR CIRCULATION:  Symmetric A1 segments and anterior cerebral arteries with normal enhancement  Normal anterior communicating artery   MIDDLE CEREBRAL ARTERY CIRCULATION:  M1 segment and middle cerebral artery branches demonstrate normal enhancement bilaterally  DISTAL VERTEBRAL ARTERIES:  Normal distal vertebral arteries  Posterior inferior cerebellar artery origins are normal  Normal vertebral basilar junction  BASILAR ARTERY:  Basilar artery is normal in caliber  Normal superior cerebellar arteries  POSTERIOR CEREBRAL ARTERIES: Both posterior cerebral arteries arises from the basilar tip  Both arteries demonstrate normal enhancement  Normal posterior communicating arteries  DURAL VENOUS SINUSES:  Normal  NON VASCULAR ANATOMY BONY STRUCTURES:  Cervical spine degenerative changes causing multilevel neural foraminal narrowing  SOFT TISSUES OF THE NECK:  Normal  THORACIC INLET:  Unremarkable  Impression: No evidence of large vessel occlusion  No evidence of dissection  Workstation performed: [id number]     Ct Head Without Contrast    Result Date: 1/29/2021  Narrative: CT BRAIN - WITHOUT CONTRAST INDICATION:   TIA, initial exam LUE weakness  COMPARISON:  None  TECHNIQUE:  CT examination of the brain was performed  In addition to axial images, sagittal and coronal 2D reformatted images were created and submitted for interpretation  Radiation dose length product (DLP) for this visit:  827 93 mGy-cm   This examination, like all CT scans performed in the [facility], was performed utilizing techniques to minimize radiation dose exposure, including the use of iterative  reconstruction and automated exposure control  IMAGE QUALITY:  Diagnostic  FINDINGS: PARENCHYMA:  No intracranial mass, mass effect or midline shift  No CT signs of acute infarction  No acute parenchymal hemorrhage  VENTRICLES AND EXTRA-AXIAL SPACES:  Normal for the patient's age  VISUALIZED ORBITS AND PARANASAL SINUSES:  Unremarkable  CALVARIUM AND EXTRACRANIAL SOFT TISSUES:  Normal      Impression: No acute intracranial abnormality   Workstation performed: [id number]     Mri Brain Wo Contrast    Result Date: 1/29/2021  Narrative: MRI BRAIN WITHOUT CONTRAST INDICATION: TIA  COMPARISON:   None  TECHNIQUE:  Sagittal T1, axial T2, axial FLAIR, axial T1, axial [place] and axial diffusion imaging  IMAGE QUALITY:  Diagnostic  FINDINGS: BRAIN PARENCHYMA:  There is no discrete mass, mass effect or midline shift  There is no intracranial hemorrhage  There is no evidence of acute infarction and diffusion imaging is unremarkable  There are no white matter changes in the cerebral hemispheres  VENTRICLES:  Normal for the patient's age  SELLA AND PITUITARY GLAND:  Normal  ORBITS:  Normal  PARANASAL SINUSES:  Normal  VASCULATURE:  Evaluation of the major intracranial vasculature demonstrates appropriate flow voids  CALVARIUM AND SKULL BASE:  Normal  EXTRACRANIAL SOFT TISSUES:  Normal      Impression: No MR evidence of acute ischemia  Workstation performed: [id number]       Incidental Findings:   · none    Test Results Pending at Discharge (will require follow up):   · As per After Visit Summary     Outpatient Tests Requested:  · EEG  · Echo  · Lipid panel, hemoglobin B8O    Complications:  Refer to hospital course and above listed diagnosis related plan, if any    Hospital Course:     Sarah Oneal is a 63 y o  female patient who originally presented to the hospital on 1/29/2021 due to left-sided jaw pain and sensation of facial twitching along with left upper extremity weakness/heaviness lasting about 30 minutes while at work  Symptoms resolved while still at work but was convinced by her coworkers to come in  Patient states that she is supposed to take calcium supplements at home but has not taken it in about 2 weeks  Patient admitted and seen by Neurology and cleared by Neurology prior to discharge  Plan of care was also discussed with her  over the phone    Please see above list of diagnoses and related plan for additional information         Condition at Discharge: stable Discharge Day Visit / Exam:     Subjective:  Denies any further jaw pain, facial twitching, weakness    Vitals: Blood Pressure: 110/93 (01/29/21 1540)  Pulse: 81 (01/29/21 1540)  Temperature: 97 6 °F (36 4 °C) (01/29/21 1540)  Temp Source: Oral (01/29/21 1500)  Respirations: 16 (01/29/21 1500)  Height: 5' 3" (160 cm) (01/29/21 1700)  Weight - Scale: 52 2 kg (115 lb 1 3 oz) (01/29/21 1700)  SpO2: 98 % (01/29/21 1540)  Exam:   Physical Exam  Constitutional:       General: She is not in acute distress  Appearance: She is not diaphoretic  HENT:      Head: Normocephalic and atraumatic  Eyes:      General:         Right eye: No discharge  Left eye: No discharge  Cardiovascular:      Rate and Rhythm: Normal rate and regular rhythm  Pulmonary:      Effort: Pulmonary effort is normal  No respiratory distress  Breath sounds: Normal breath sounds  No wheezing or rales  Abdominal:      General: Bowel sounds are normal  There is no distension  Palpations: Abdomen is soft  Tenderness: There is no abdominal tenderness  Musculoskeletal:      Right lower leg: No edema  Left lower leg: No edema  Neurological:      General: No focal deficit present  Mental Status: She is alert and oriented to person, place, and time  Cranial Nerves: No cranial nerve deficit  Motor: No weakness  Coordination: Coordination normal          Discharge instructions/Information to patient and family:(Discharge Medications and Follow up):   See after visit summary for information provided to patient and family  Provisions for Follow-Up Care:  See after visit summary for information related to follow-up care and any pertinent home health orders  Disposition: Home    Planned Readmission:  No     Discharge Statement:  I spent 15 minutes discharging the patient  This time was spent on the day of discharge  I had direct contact with the patient on the day of discharge   Greater than 50% of the total time was spent examining patient, answering all patient questions, arranging and discussing plan of care with patient as well as directly providing post-discharge instructions  Additional time then spent on discharge activities  Discharge Medications:  See after visit summary for reconciled discharge medications provided to patient and family  ** Please Note: "This note has been constructed using a voice recognition system  Therefore there may be syntax, spelling, and/or grammatical errors   Please call if you have any questions  "**

## 2021-01-30 NOTE — PLAN OF CARE

## 2021-02-03 ENCOUNTER — TELEPHONE (OUTPATIENT)
Dept: FAMILY MEDICINE CLINIC | Facility: CLINIC | Age: 56
End: 2021-02-03

## 2021-02-03 NOTE — TELEPHONE ENCOUNTER
I looked at pt's labs--mag level is normal=2 2 and I do not see any vit D level--please check if done elsewhere or if I just missed seeing-thanks

## 2021-02-03 NOTE — TELEPHONE ENCOUNTER
Spoke with pt apt 2-8-21 virtual - states taking fosmax had left side facial twitching was told in ER could be lack of calcium due to taking  fosmax

## 2021-02-08 ENCOUNTER — TELEMEDICINE (OUTPATIENT)
Dept: FAMILY MEDICINE CLINIC | Facility: CLINIC | Age: 56
End: 2021-02-08
Payer: COMMERCIAL

## 2021-02-08 VITALS — BODY MASS INDEX: 20.38 KG/M2 | WEIGHT: 115 LBS | TEMPERATURE: 97.5 F | HEIGHT: 63 IN

## 2021-02-08 DIAGNOSIS — R29.898 UPPER EXTREMITY WEAKNESS: ICD-10-CM

## 2021-02-08 DIAGNOSIS — G45.9 TIA (TRANSIENT ISCHEMIC ATTACK): ICD-10-CM

## 2021-02-08 DIAGNOSIS — R68.84 JAW PAIN: ICD-10-CM

## 2021-02-08 DIAGNOSIS — E78.5 HYPERLIPIDEMIA, UNSPECIFIED HYPERLIPIDEMIA TYPE: ICD-10-CM

## 2021-02-08 DIAGNOSIS — G51.4 FACIAL TWITCHING: Primary | ICD-10-CM

## 2021-02-08 PROCEDURE — 1036F TOBACCO NON-USER: CPT | Performed by: FAMILY MEDICINE

## 2021-02-08 PROCEDURE — 99213 OFFICE O/P EST LOW 20 MIN: CPT | Performed by: FAMILY MEDICINE

## 2021-02-08 PROCEDURE — 1111F DSCHRG MED/CURRENT MED MERGE: CPT | Performed by: FAMILY MEDICINE

## 2021-02-08 PROCEDURE — 3008F BODY MASS INDEX DOCD: CPT | Performed by: FAMILY MEDICINE

## 2021-02-18 PROBLEM — G45.9 TIA (TRANSIENT ISCHEMIC ATTACK): Status: ACTIVE | Noted: 2021-02-18

## 2021-02-18 PROBLEM — E78.5 HYPERLIPIDEMIA: Status: ACTIVE | Noted: 2021-02-18

## 2021-02-18 PROBLEM — R68.84 JAW PAIN: Status: ACTIVE | Noted: 2021-02-18

## 2021-02-18 PROBLEM — R29.898 UPPER EXTREMITY WEAKNESS: Status: ACTIVE | Noted: 2021-02-18

## 2021-02-19 NOTE — PROGRESS NOTES
Virtual Regular Visit      Assessment/Plan:    Problem List Items Addressed This Visit     Facial twitching - Primary    Relevant Orders    Ambulatory referral to Neurology    Echo complete with contrast if indicated    Jaw pain    Relevant Orders    Ambulatory referral to Neurology    Echo complete with contrast if indicated    Upper extremity weakness    Relevant Orders    Ambulatory referral to Neurology    Echo complete with contrast if indicated    Hyperlipidemia    Relevant Orders    Lipid Panel with Direct LDL reflex    Comprehensive metabolic panel    TIA (transient ischemic attack)               Reason for visit is   Chief Complaint   Patient presents with    Follow-up     er f/u TIa pt feeling good     Virtual Regular Visit        Encounter provider Sunitha Huerta MD    Provider located at [address] [place] [id number]      Recent Visits  No visits were found meeting these conditions  Showing recent visits within past 7 days and meeting all other requirements     Future Appointments  No visits were found meeting these conditions  Showing future appointments within next 150 days and meeting all other requirements        The patient was identified by name and date of birth  Jagruti Hernandes was informed that this is a telemedicine visit and that the visit is being conducted through [facility] and patient was informed that this is a secure, HIPAA-compliant platform  She agrees to proceed     My office door was closed  No one else was in the room  She acknowledged consent and understanding of privacy and security of the video platform  The patient has agreed to participate and understands they can discontinue the visit at any time  Patient is aware this is a billable service  Subjective  Jagruti Hernandes is a 63 y o  female          HPI   Follow-up  [facility] eval 1/29/2021 reviewed  Patient had written presented to ED with episode of facial twitching, left-sided jaw pain and left upper extremity weakness while at work  MRI brain was negative for CVA  Neurology was consulted and felt symptoms could possibly be related to TIA versus focal seizure versus migraine  Patient advised outpatient EEG and echo  Also was to start baby aspirin and to consider start of statin therapy    Past Medical History:   Diagnosis Date    Fracture     collar bone r side    Mediastinal adenopathy 02/2019    [id number]    Thyroid nodule     currently too small to do a bx-watching    Vertigo     from stress       Past Surgical History:   Procedure Laterality Date    BREAST SURGERY Right     bx-benign    [place] COLONOSCOPY FLX DX W/COLLJ SPEC WHEN PFRMD N/A 4/9/2018    Procedure: COLONOSCOPY;  Surgeon: Cristina Weeks MD;  [facility]; Service: Gastroenterology    TUBAL LIGATION         Current Outpatient Medications   Medication Sig Dispense Refill    alendronate (FOSAMAX) 70 mg tablet Take 70 mg by mouth every 7 days      aspirin 81 mg chewable tablet Chew 1 tablet (81 mg total) daily 30 tablet 0    calcium carbonate-vitamin D (OSCAL-D) 500 mg-200 units per tablet Take 1 tablet by mouth daily with breakfast       No current facility-administered medications for this visit  No Known Allergies    Review of Systems   Constitutional: Positive for fatigue  Negative for fever  HENT:        Jaw pain   Respiratory: Negative  Cardiovascular: Negative  Neurological: Positive for numbness  Episode facial twitching         Video Exam    Vitals:    02/08/21 1308   Temp: 97 5 °F (36 4 °C)   Weight: 52 2 kg (115 lb)   Height: 5' 3" (1 6 m)       Physical Exam  Constitutional:       General: She is not in acute distress  Pulmonary:      Effort: No respiratory distress  Neurological:      Mental Status: She is alert and oriented to person, place, and time  Psychiatric:         Mood and Affect: Mood normal          Thought Content:  Thought content normal           I spent 20 minutes directly with the patient during this visit      VIRTUAL VISIT DISCLAIMER    Eleno Oshea acknowledges that she has consented to an online visit or consultation  She understands that the online visit is based solely on information provided by her, and that, in the absence of a face-to-face physical evaluation by the physician, the diagnosis she receives is both limited and provisional in terms of accuracy and completeness  This is not intended to replace a full medical face-to-face evaluation by the physician  Eleno Oshea understands and accepts these terms

## 2021-03-22 ENCOUNTER — HOSPITAL ENCOUNTER (OUTPATIENT)
Dept: NEUROLOGY | Facility: HOSPITAL | Age: 56
Discharge: HOME | End: 2021-03-22
Attending: PSYCHIATRY & NEUROLOGY
Payer: COMMERCIAL

## 2021-03-22 DIAGNOSIS — G51.4 FACIAL TWITCHING: ICD-10-CM

## 2021-03-22 PROCEDURE — 95819 EEG AWAKE AND ASLEEP: CPT

## 2021-03-24 DIAGNOSIS — R25.3 TWITCHING: Primary | ICD-10-CM

## 2021-03-24 DIAGNOSIS — R94.01 ABNORMAL EEG: ICD-10-CM

## 2021-03-24 PROCEDURE — 95812 EEG 41-60 MINUTES: CPT | Performed by: PSYCHIATRY & NEUROLOGY

## 2021-03-24 RX ORDER — LEVETIRACETAM 500 MG/1
500 TABLET ORAL EVERY 12 HOURS SCHEDULED
Qty: 60 TABLET | Refills: 3 | Status: SHIPPED | OUTPATIENT
Start: 2021-03-24 | End: 2021-03-30 | Stop reason: CLARIF

## 2021-03-25 ENCOUNTER — TELEPHONE (OUTPATIENT)
Dept: NEUROLOGY | Facility: CLINIC | Age: 56
End: 2021-03-25

## 2021-03-25 DIAGNOSIS — Z23 ENCOUNTER FOR IMMUNIZATION: ICD-10-CM

## 2021-03-25 NOTE — TELEPHONE ENCOUNTER
----- Message from Marcelo eDsai MD sent at 3/24/2021  5:10 PM EDT -----  Patient's EEG is abnormal with infrequent seizure discharges  We recommend she take seizure medication, keppra  It's typically well tolerated and doesn't affect any organ system  Will send script  If she has concerns, can express

## 2021-03-25 NOTE — TELEPHONE ENCOUNTER
I called and spoke with the patient  She would like to come in and discuss this result and medication  I have placed her on a cancellation for list for this Monday

## 2021-03-30 ENCOUNTER — TELEMEDICINE (OUTPATIENT)
Dept: FAMILY MEDICINE CLINIC | Facility: CLINIC | Age: 56
End: 2021-03-30
Payer: COMMERCIAL

## 2021-03-30 DIAGNOSIS — R94.01 EEG ABNORMALITY: Primary | ICD-10-CM

## 2021-03-30 DIAGNOSIS — E04.1 THYROID NODULE: ICD-10-CM

## 2021-03-30 DIAGNOSIS — G45.9 TIA (TRANSIENT ISCHEMIC ATTACK): ICD-10-CM

## 2021-03-30 DIAGNOSIS — M85.80 OSTEOPENIA, UNSPECIFIED LOCATION: ICD-10-CM

## 2021-03-30 DIAGNOSIS — G51.4 FACIAL TWITCHING: ICD-10-CM

## 2021-03-30 PROCEDURE — 99213 OFFICE O/P EST LOW 20 MIN: CPT | Performed by: FAMILY MEDICINE

## 2021-03-30 RX ORDER — ASPIRIN 81 MG/1
81 TABLET, CHEWABLE ORAL DAILY
Qty: 90 TABLET | Refills: 3 | Status: SHIPPED | OUTPATIENT
Start: 2021-03-30 | End: 2021-11-14 | Stop reason: ALTCHOICE

## 2021-03-30 RX ORDER — CALCIUM CARBONATE/VITAMIN D3 500 MG-10
1 TABLET ORAL DAILY
Qty: 90 TABLET | Refills: 3 | Status: SHIPPED | OUTPATIENT
Start: 2021-03-30 | End: 2021-08-22 | Stop reason: ALTCHOICE

## 2021-03-30 RX ORDER — ALENDRONATE SODIUM 70 MG/1
70 TABLET ORAL
Qty: 12 TABLET | Refills: 3 | Status: SHIPPED | OUTPATIENT
Start: 2021-03-30 | End: 2021-08-22 | Stop reason: ALTCHOICE

## 2021-03-30 NOTE — TELEPHONE ENCOUNTER
I called and spoke with the patient to offer her this Thursday for an appointment as you have had no cancellations so far, and she has decided to seek another opinion with another neurologist

## 2021-03-31 ENCOUNTER — TELEPHONE (OUTPATIENT)
Dept: NEUROLOGY | Facility: CLINIC | Age: 56
End: 2021-03-31

## 2021-03-31 NOTE — TELEPHONE ENCOUNTER
Received a vm that the patient requested her records to be sent to her new neurologist that she's seeing today  Faxed the EMG report to Dr Kati Hogue office @ [phone] as requested

## 2021-04-18 PROBLEM — R94.01 EEG ABNORMALITY: Status: ACTIVE | Noted: 2021-04-18

## 2021-04-18 PROBLEM — M81.0 OSTEOPOROSIS: Status: ACTIVE | Noted: 2020-08-01

## 2021-04-18 NOTE — PROGRESS NOTES
Virtual Regular Visit      Assessment/Plan:    Problem List Items Addressed This Visit     Thyroid nodule     T/c follow-up thyroid u/s at next visit  +fhx thyroid ca-mother         Osteopenia    Relevant Medications    alendronate (FOSAMAX) 70 mg tablet    Calcium Carb-Cholecalciferol (Oyster Shell Calcium + D) 500-400 MG-UNIT TABS    Facial twitching    TIA (transient ischemic attack)     ? TIA vs possible epileptiform activity  Pt req 2nd neuro opinion         Relevant Medications    aspirin 81 mg chewable tablet    EEG abnormality - Primary     Consult from Dr Lucinda Spears and EEG/imaging results reviewed  Pt has appt for second opinion sched w [facility] neurologist-  Rosey Gan on 3/31/2021  await further recommendations                    Reason for visit is   Chief Complaint   Patient presents with    Virtual Regular Visit        Encounter provider Yasmin Oquendo MD    Provider located at [address] [place] [phone]      Recent Visits  No visits were found meeting these conditions  Showing recent visits within past 7 days and meeting all other requirements     Future Appointments  No visits were found meeting these conditions  Showing future appointments within next 150 days and meeting all other requirements        The patient was identified by name and date of birth  Domonique Leon was informed that this is a telemedicine visit and that the visit is being conducted through [facility] and patient was informed that this is a secure, HIPAA-compliant platform  She agrees to proceed     My office door was closed  No one else was in the room  She acknowledged consent and understanding of privacy and security of the video platform  The patient has agreed to participate and understands they can discontinue the visit at any time  Patient is aware this is a billable service  Subjective  Domonique Leon is a 63 y o  female          HPI Follow-up  Interval hx--?abnl EEG-Occasional left temporal sharply contoured activity concerning for epileptiform potentials  Neuro-Dr William Nails recommending start of Keppra  Pt would like second opinion  Has appt w [facility] neuro-Dr Mercedes garcia for tomorrow  Reports no further episodes of facial twitching or LUE achiness/numbness    Past Medical History:   Diagnosis Date    Fracture     collar bone r side    Mediastinal adenopathy 02/2019    [id number]    Thyroid nodule     currently too small to do a bx-watching    Vertigo     from stress       Past Surgical History:   Procedure Laterality Date    BREAST SURGERY Right     bx-benign    [place] COLONOSCOPY FLX DX W/COLLJ SPEC WHEN PFRMD N/A 4/9/2018    Procedure: COLONOSCOPY;  Surgeon: Ellyn Snyder MD;  Location: [place] [address] GI LAB; Service: Gastroenterology    TUBAL LIGATION         Current Outpatient Medications   Medication Sig Dispense Refill    alendronate (FOSAMAX) 70 mg tablet Take 1 tablet (70 mg total) by mouth every 7 days 12 tablet 3    aspirin 81 mg chewable tablet Chew 1 tablet (81 mg total) daily 90 tablet 3    Calcium Carb-Cholecalciferol (Oyster Shell Calcium + D) 500-400 MG-UNIT TABS Take 1 tablet by mouth daily 90 tablet 3     No current facility-administered medications for this visit  No Known Allergies    Review of Systems   Constitutional: Negative  Respiratory: Negative  Cardiovascular: Negative  Neurological:        ?TIA  ? Abnl EEG       Video Exam  Physical Exam   AAOx3  NAD  Speech clear, answering questions appropriately  I spent 16 minutes directly with the patient during this visit      VIRTUAL VISIT DISCLAIMER    Rosendo Hodges acknowledges that she has consented to an online visit or consultation   She understands that the online visit is based solely on information provided by her, and that, in the absence of a face-to-face physical evaluation by the physician, the diagnosis she receives is both limited and provisional in terms of accuracy and completeness  This is not intended to replace a full medical face-to-face evaluation by the [facility] understands and accepts these terms

## 2021-04-18 NOTE — ASSESSMENT & PLAN NOTE
Consult from Dr Corrina Monique and EEG/imaging results reviewed  Pt has appt for second opinion sched w [facility] neurologist-  Shawn Sam on 3/31/2021  await further recommendations

## 2021-07-21 ENCOUNTER — OFFICE VISIT (OUTPATIENT)
Dept: FAMILY MEDICINE CLINIC | Facility: CLINIC | Age: 56
End: 2021-07-21
Payer: COMMERCIAL

## 2021-07-21 VITALS
RESPIRATION RATE: 14 BRPM | HEART RATE: 104 BPM | DIASTOLIC BLOOD PRESSURE: 80 MMHG | WEIGHT: 115.8 LBS | BODY MASS INDEX: 20.52 KG/M2 | HEIGHT: 63 IN | TEMPERATURE: 97.9 F | SYSTOLIC BLOOD PRESSURE: 120 MMHG

## 2021-07-21 DIAGNOSIS — R05.9 COUGH: Primary | ICD-10-CM

## 2021-07-21 DIAGNOSIS — Z12.31 ENCOUNTER FOR SCREENING MAMMOGRAM FOR MALIGNANT NEOPLASM OF BREAST: ICD-10-CM

## 2021-07-21 DIAGNOSIS — J45.20 MILD INTERMITTENT REACTIVE AIRWAY DISEASE WITHOUT COMPLICATION: ICD-10-CM

## 2021-07-21 DIAGNOSIS — J02.9 SORETHROAT: ICD-10-CM

## 2021-07-21 PROCEDURE — 1036F TOBACCO NON-USER: CPT | Performed by: FAMILY MEDICINE

## 2021-07-21 PROCEDURE — 3725F SCREEN DEPRESSION PERFORMED: CPT | Performed by: FAMILY MEDICINE

## 2021-07-21 PROCEDURE — 99213 OFFICE O/P EST LOW 20 MIN: CPT | Performed by: FAMILY MEDICINE

## 2021-07-21 RX ORDER — PROMETHAZINE HYDROCHLORIDE AND CODEINE PHOSPHATE 6.25; 1 MG/5ML; MG/5ML
5 SYRUP ORAL EVERY 6 HOURS PRN
Qty: 120 ML | Refills: 0 | Status: SHIPPED | OUTPATIENT
Start: 2021-07-21 | End: 2021-08-22 | Stop reason: ALTCHOICE

## 2021-07-21 RX ORDER — AZITHROMYCIN 250 MG/1
TABLET, FILM COATED ORAL
Qty: 6 TABLET | Refills: 0 | Status: SHIPPED | OUTPATIENT
Start: 2021-07-21 | End: 2021-08-22 | Stop reason: ALTCHOICE

## 2021-07-21 NOTE — LETTER
July 21, 2021     Patient: Иван Ramirez   YOB: 1965   Date of Visit: 7/21/2021       To Whom it May Concern:    Иван Ramirez is under my professional care  She was seen in my office on 7/21/2021  She may return to work on 07/26/2021  Please excuse work absences 7/20, 7/21 and 7/22/2021       If you have any questions or concerns, please don't hesitate to call           Sincerely,          Maritza Khoury MD

## 2021-07-27 ENCOUNTER — TELEMEDICINE (OUTPATIENT)
Dept: FAMILY MEDICINE CLINIC | Facility: CLINIC | Age: 56
End: 2021-07-27
Payer: COMMERCIAL

## 2021-07-27 VITALS — WEIGHT: 115 LBS | TEMPERATURE: 98.4 F | BODY MASS INDEX: 20.38 KG/M2 | HEIGHT: 63 IN

## 2021-07-27 DIAGNOSIS — J06.9 URI WITH COUGH AND CONGESTION: Primary | ICD-10-CM

## 2021-07-27 PROCEDURE — 3008F BODY MASS INDEX DOCD: CPT | Performed by: FAMILY MEDICINE

## 2021-07-27 PROCEDURE — 99212 OFFICE O/P EST SF 10 MIN: CPT | Performed by: FAMILY MEDICINE

## 2021-07-27 NOTE — LETTER
July 27, 2021     Patient: Festus Kennedy   YOB: 1965   Date of Visit: 7/27/2021       To Whom it May Concern:    Festus Kennedy is under my professional care  She was evaluated on 7/27/2021  She may return to work on 7/28/2021  If you have any questions or concerns, please don't hesitate to call  Sincerely,          Kalina Contreras MD        CC: [email or web address]

## 2021-08-01 PROBLEM — R05.9 COUGH: Status: ACTIVE | Noted: 2021-08-01

## 2021-08-01 PROBLEM — J02.9 SORETHROAT: Status: ACTIVE | Noted: 2021-08-01

## 2021-08-01 PROBLEM — Z12.31 ENCOUNTER FOR SCREENING MAMMOGRAM FOR MALIGNANT NEOPLASM OF BREAST: Status: ACTIVE | Noted: 2020-07-14

## 2021-08-02 NOTE — PROGRESS NOTES
Assessment/Plan:    1  Cough  -     azithromycin (Zithromax) 250 mg tablet; Take 2 tablets (500 mg total) by mouth daily for 1 day, THEN 1 tablet (250 mg total) daily for 4 days  (Patient not taking: Reported on 7/27/2021)  -     promethazine-codeine (PHENERGAN WITH CODEINE) 6 25-10 mg/5 mL syrup; Take 5 mL by mouth every 6 (six) hours as needed for cough (Patient not taking: Reported on 7/27/2021)    2  Mild intermittent reactive airway disease without complication    3  Sorethroat    4  Encounter for screening mammogram for malignant neoplasm of breast  -     Mammo screening bilateral w 3d & cad; Future; Expected date: 07/21/2021    t/c COVID testing if sxs persist  rec cont rest/hydration/otc analgesics/immune supplements      Subjective:      Patient ID: Evelyn Sotomayor is a 63 y o  female  Chief Complaint   Patient presents with    Cough    Sore Throat     body aches     Headache       Cough  This is a new problem  The current episode started in the past 7 days  The problem has been gradually worsening  The cough is non-productive  Associated symptoms include headaches, nasal congestion, postnasal drip, rhinorrhea and a sore throat  Pertinent negatives include no fever, heartburn, hemoptysis, rash or shortness of breath  Her past medical history is significant for environmental allergies  Had COVID vaccine  No known new exposures    The following portions of the patient's history were reviewed and updated as appropriate: allergies, current medications, past family history, past medical history, past social history, past surgical history and problem list     Review of Systems   Constitutional: Positive for fatigue  Negative for fever  HENT: Positive for congestion, postnasal drip, rhinorrhea, sinus pressure and sore throat  Respiratory: Positive for cough  Negative for hemoptysis and shortness of breath  Cardiovascular: Negative  Gastrointestinal: Negative for heartburn     Skin: Negative for rash    Allergic/Immunologic: Positive for environmental allergies  Neurological: Positive for headaches  Current Outpatient Medications   Medication Sig Dispense Refill    aspirin 81 mg chewable tablet Chew 1 tablet (81 mg total) daily 90 tablet 3    Calcium Carb-Cholecalciferol (Oyster Shell Calcium + D) 500-400 MG-UNIT TABS Take 1 tablet by mouth daily (Patient not taking: Reported on 7/27/2021) 90 tablet 3    Turmeric (QC TUMERIC COMPLEX PO)  (Patient not taking: Reported on 7/27/2021)      alendronate (FOSAMAX) 70 mg tablet Take 1 tablet (70 mg total) by mouth every 7 days (Patient not taking: Reported on 7/21/2021) 12 tablet 3    promethazine-codeine (PHENERGAN WITH CODEINE) 6 25-10 mg/5 mL syrup Take 5 mL by mouth every 6 (six) hours as needed for cough (Patient not taking: Reported on 7/27/2021) 120 mL 0     No current facility-administered medications for this visit  Objective:    /80 (BP Location: Left arm, Patient Position: Sitting, Cuff Size: Large)   Pulse 104   Temp 97 9 °F (36 6 °C)   Resp 14   Ht 5' 3" (1 6 m)   Wt 52 5 kg (115 lb 12 8 oz)   BMI 20 51 kg/m²        Physical Exam  Vitals and nursing note reviewed  Constitutional:       Comments: Looks tired   HENT:      Ears:      Comments: TMs dull     Nose: Congestion present  Mouth/Throat:      Pharynx: Posterior oropharyngeal erythema present  No oropharyngeal exudate  Cardiovascular:      Rate and Rhythm: Normal rate and regular rhythm  Pulmonary:      Effort: Pulmonary effort is normal  No respiratory distress  Breath sounds: Normal breath sounds  Abdominal:      General: Bowel sounds are normal       Palpations: Abdomen is soft  Tenderness: There is no abdominal tenderness  Musculoskeletal:      Cervical back: Neck supple  Skin:     General: Skin is warm and dry  Findings: No rash  Neurological:      General: No focal deficit present        Mental Status: She is alert and oriented to person, place, and time     Psychiatric:         Mood and Affect: Mood normal          Ja Parra MD

## 2021-08-22 PROBLEM — J06.9 URI WITH COUGH AND CONGESTION: Status: ACTIVE | Noted: 2021-08-22

## 2021-08-22 NOTE — ASSESSMENT & PLAN NOTE
Resolving  Cont rest/fluids/nasal saline/vicks vapor/tylenol/flonase prn  daily immune supplements  Juanito lif any change/concern in condition

## 2021-08-22 NOTE — PROGRESS NOTES
stress       Past Surgical History:   Procedure Laterality Date    BREAST SURGERY Right     bx-benign    NV COLONOSCOPY FLX DX W/COLLJ SPEC WHEN PFRMD N/A 4/9/2018    Procedure: COLONOSCOPY;  Surgeon: Krishna Chow MD;  Location: [facility]; Service: Gastroenterology    TUBAL LIGATION         Current Outpatient Medications   Medication Sig Dispense Refill    aspirin 81 mg chewable tablet Chew 1 tablet (81 mg total) daily 90 tablet 3     No current facility-administered medications for this visit  No Known Allergies    Review of Systems   Constitutional: Positive for fatigue  Negative for chills and fever  HENT: Positive for congestion  Respiratory: Negative for shortness of breath and wheezing  Cardiovascular: Negative  Gastrointestinal: Negative  Video Exam    Vitals:    07/27/21 1009   Temp: 98 4 °F (36 9 °C)   TempSrc: Temporal   Weight: 52 2 kg (115 lb)   Height: 5' 3" (1 6 m)       Physical Exam   AAOx3  NAD  Sounds congested, no audible wheeze  I spent 15 minutes directly with the patient during this visit    VIRTUAL VISIT DISCLAIMER      Flory Ramsey verbally agrees to participate in [employer]  Pt is aware that [employer] could be limited without vital signs or the ability to perform a full hands-on physical Rocky Flores understands she or the provider may request at any time to terminate the video visit and request the patient to seek care or treatment in person

## 2021-11-03 ENCOUNTER — OFFICE VISIT (OUTPATIENT)
Dept: FAMILY MEDICINE CLINIC | Facility: CLINIC | Age: 56
End: 2021-11-03
Payer: COMMERCIAL

## 2021-11-03 VITALS
HEART RATE: 60 BPM | HEIGHT: 63 IN | TEMPERATURE: 97.5 F | RESPIRATION RATE: 14 BRPM | SYSTOLIC BLOOD PRESSURE: 102 MMHG | DIASTOLIC BLOOD PRESSURE: 80 MMHG | WEIGHT: 122.6 LBS | BODY MASS INDEX: 21.72 KG/M2

## 2021-11-03 DIAGNOSIS — M85.80 OSTEOPENIA, UNSPECIFIED LOCATION: ICD-10-CM

## 2021-11-03 DIAGNOSIS — Z00.00 PHYSICAL EXAM: Primary | ICD-10-CM

## 2021-11-03 DIAGNOSIS — Z11.59 NEED FOR HEPATITIS C SCREENING TEST: ICD-10-CM

## 2021-11-03 DIAGNOSIS — E04.1 THYROID NODULE: ICD-10-CM

## 2021-11-03 DIAGNOSIS — J45.20 MILD INTERMITTENT REACTIVE AIRWAY DISEASE WITHOUT COMPLICATION: ICD-10-CM

## 2021-11-03 DIAGNOSIS — H53.8 BLURRED VISION: ICD-10-CM

## 2021-11-03 DIAGNOSIS — E06.3 HASHIMOTO'S THYROIDITIS: ICD-10-CM

## 2021-11-03 PROCEDURE — 99396 PREV VISIT EST AGE 40-64: CPT | Performed by: FAMILY MEDICINE

## 2021-11-03 PROCEDURE — 3008F BODY MASS INDEX DOCD: CPT | Performed by: FAMILY MEDICINE

## 2021-11-03 PROCEDURE — 1036F TOBACCO NON-USER: CPT | Performed by: FAMILY MEDICINE

## 2021-11-04 ENCOUNTER — TELEPHONE (OUTPATIENT)
Dept: ADMINISTRATIVE | Facility: OTHER | Age: 56
End: 2021-11-04

## 2021-11-14 PROBLEM — Z00.00 PHYSICAL EXAM: Status: ACTIVE | Noted: 2020-07-14

## 2021-11-14 PROBLEM — R30.0 DYSURIA: Status: ACTIVE | Noted: 2021-11-14

## 2021-11-14 PROBLEM — H53.8 BLURRED VISION: Status: ACTIVE | Noted: 2021-11-14

## 2021-11-14 PROBLEM — Z11.59 NEED FOR HEPATITIS C SCREENING TEST: Status: ACTIVE | Noted: 2021-11-14

## 2021-12-14 ENCOUNTER — OFFICE VISIT (OUTPATIENT)
Dept: URGENT CARE | Facility: CLINIC | Age: 56
End: 2021-12-14
Payer: COMMERCIAL

## 2021-12-14 VITALS
TEMPERATURE: 98.7 F | HEIGHT: 63 IN | OXYGEN SATURATION: 94 % | HEART RATE: 92 BPM | DIASTOLIC BLOOD PRESSURE: 70 MMHG | SYSTOLIC BLOOD PRESSURE: 130 MMHG | WEIGHT: 125 LBS | RESPIRATION RATE: 16 BRPM | BODY MASS INDEX: 22.15 KG/M2

## 2021-12-14 DIAGNOSIS — J02.9 SORE THROAT: ICD-10-CM

## 2021-12-14 DIAGNOSIS — J06.9 VIRAL UPPER RESPIRATORY TRACT INFECTION: Primary | ICD-10-CM

## 2021-12-14 DIAGNOSIS — R05.9 COUGH: ICD-10-CM

## 2021-12-14 LAB — S PYO AG THROAT QL: NEGATIVE

## 2021-12-14 PROCEDURE — 87070 CULTURE OTHR SPECIMN AEROBIC: CPT | Performed by: PHYSICIAN ASSISTANT

## 2021-12-14 PROCEDURE — 99213 OFFICE O/P EST LOW 20 MIN: CPT | Performed by: PHYSICIAN ASSISTANT

## 2021-12-14 PROCEDURE — 3008F BODY MASS INDEX DOCD: CPT | Performed by: FAMILY MEDICINE

## 2021-12-14 PROCEDURE — 87880 STREP A ASSAY W/OPTIC: CPT | Performed by: PHYSICIAN ASSISTANT

## 2021-12-14 RX ORDER — BENZONATATE 200 MG/1
200 CAPSULE ORAL 3 TIMES DAILY PRN
Qty: 20 CAPSULE | Refills: 0 | Status: SHIPPED | OUTPATIENT
Start: 2021-12-14

## 2021-12-14 NOTE — PATIENT INSTRUCTIONS
Viral upper respiratory infection  Recommend mucinex D for cough/postnasal drip  rx tessalon pearls sent via EMR  Rest, fluids and supportive care  May benefit from a cool mist humidifier on night stand  Tylenol/ibuprofen as needed for pain/fever    Follow up with PCP in 3-5 days  Proceed to  ER if symptoms worsen

## 2021-12-14 NOTE — PROGRESS NOTES
[facility]        NAME: Delgado Archer is a 61 y o  female  : 1965    MRN: [id number]  DATE: 2021  TIME: 5:39 PM    Assessment and Plan   Viral upper respiratory tract infection [J06 9]  1  Viral upper respiratory tract infection     2  Cough  benzonatate (TESSALON) 200 MG capsule    POCT rapid strepA    Throat culture   3  Sore throat       Patient Instructions   Viral upper respiratory infection  Recommend mucinex D for cough/postnasal drip  rx tessalon pearls sent via EMR  Rest, fluids and supportive care  May benefit from a cool mist humidifier on night stand  Tylenol/ibuprofen as needed for pain/fever    Follow up with PCP in 3-5 days  Proceed to  ER if symptoms worsen  Chief Complaint     Chief Complaint   Patient presents with    Sore Throat     3/4 days Sore throat, headache,          History of Present Illness       Norris Peralta is a 25-year-old female who presents to clinic complaining of sore throat x4 days  She is also complaining of cough that she describes as productive with a yellow sputum occasionally  She does note coughing fits as well  She also notes mild frontal headache x3 days  She denies any fever, chills, fatigue, myalgias, ear pain, shortness of breath, nausea, vomiting, diarrhea, nasal congestion, rhinorrhea, loss of taste or smell, recent travel, or exposure to anyone known COVID positive  Review of Systems   Review of Systems   Constitutional: Negative for chills, fatigue and fever  HENT: Positive for sore throat  Negative for congestion, ear pain, rhinorrhea, sinus pressure and sinus pain  Respiratory: Positive for cough  Gastrointestinal: Negative for diarrhea, nausea and vomiting  Musculoskeletal: Negative for myalgias  Neurological: Positive for headaches           Current Medications       Current Outpatient Medications:     benzonatate (TESSALON) 200 MG capsule, Take 1 capsule (200 mg total) by mouth 3 (three) times a day as needed for cough, Disp: 20 capsule, Rfl: 0    Current Allergies     Allergies as of 12/14/2021    (No Known Allergies)            The following portions of the patient's history were reviewed and updated as appropriate: allergies, current medications, past family history, past medical history, past social history, past surgical history and problem list      Past Medical History:   Diagnosis Date    Fracture     collar bone r side    Mediastinal adenopathy 02/2019    [id number]    Thyroid nodule     currently too small to do a bx-watching    Vertigo     from stress       Past Surgical History:   Procedure Laterality Date    BREAST SURGERY Right     bx-benign    [place] COLONOSCOPY FLX DX W/COLLJ SPEC WHEN PFRMD N/A 4/9/2018    Procedure: COLONOSCOPY;  Surgeon: Erasto Aggarwal MD;  Location: [facility]; Service: Gastroenterology    TUBAL LIGATION         Family History   Problem Relation Age of Onset    Thyroid cancer Mother     Heart disease Sister 22        open heart-valve replaced-d/t strep throat         Medications have been verified  Objective   /70   Pulse 92   Temp 98 7 °F (37 1 °C)   Resp 16   Ht 5' 3" (1 6 m)   Wt 56 7 kg (125 lb)   SpO2 94%   BMI 22 14 kg/m²   No LMP recorded  Patient is postmenopausal        Physical Exam     Physical Exam  Vitals and nursing note reviewed  Constitutional:       General: She is not in acute distress  Appearance: Normal appearance  She is not ill-appearing  HENT:      Right Ear: Tympanic membrane, ear canal and external ear normal       Left Ear: Tympanic membrane, ear canal and external ear normal       Nose: Rhinorrhea present  Mouth/Throat:      Mouth: Mucous membranes are moist       Pharynx: Posterior oropharyngeal erythema present  No oropharyngeal exudate  Cardiovascular:      Rate and Rhythm: Normal rate and regular rhythm  Heart sounds: Normal heart sounds     Pulmonary:      Effort: Pulmonary effort is normal       Breath sounds: Normal breath sounds  Lymphadenopathy:      Cervical: No cervical adenopathy  Neurological:      Mental Status: She is alert and oriented to person, place, and time     Psychiatric:         Mood and Affect: Mood normal          Behavior: Behavior normal

## 2021-12-16 LAB — BACTERIA THROAT CULT: NORMAL

## 2022-08-11 ENCOUNTER — VBI (OUTPATIENT)
Dept: ADMINISTRATIVE | Facility: OTHER | Age: 57
End: 2022-08-11

## 2022-08-11 NOTE — TELEPHONE ENCOUNTER
Thank you  Faizan Steel   08/11/22 2:35 PM     See documentation in the [employer] SmartForm       Faizan Steel

## 2022-10-02 ENCOUNTER — HOSPITAL ENCOUNTER (EMERGENCY)
Facility: HOSPITAL | Age: 57
Discharge: HOME | End: 2022-10-02
Attending: EMERGENCY MEDICINE
Payer: COMMERCIAL

## 2022-10-02 VITALS
SYSTOLIC BLOOD PRESSURE: 124 MMHG | OXYGEN SATURATION: 98 % | WEIGHT: 118 LBS | TEMPERATURE: 98.3 F | RESPIRATION RATE: 18 BRPM | DIASTOLIC BLOOD PRESSURE: 65 MMHG | HEIGHT: 63 IN | BODY MASS INDEX: 20.91 KG/M2 | HEART RATE: 82 BPM

## 2022-10-02 DIAGNOSIS — R07.89 ATYPICAL CHEST PAIN: Primary | ICD-10-CM

## 2022-10-02 LAB
2HR DELTA HS TROPONIN: <0 NG/L
ANION GAP SERPL CALCULATED.3IONS-SCNC: 10 MMOL/L (ref 4–13)
BASOPHILS # BLD AUTO: 0.01 THOUSANDS/ΜL (ref 0–0.1)
BASOPHILS NFR BLD AUTO: 0 % (ref 0–1)
BUN SERPL-MCNC: 10 MG/DL (ref 5–25)
CALCIUM SERPL-MCNC: 8.6 MG/DL (ref 8.3–10.1)
CARDIAC TROPONIN I PNL SERPL HS: 2 NG/L
CARDIAC TROPONIN I PNL SERPL HS: <2 NG/L
CHLORIDE SERPL-SCNC: 107 MMOL/L (ref 96–108)
CO2 SERPL-SCNC: 25 MMOL/L (ref 21–32)
CREAT SERPL-MCNC: 0.59 MG/DL (ref 0.6–1.3)
EOSINOPHIL # BLD AUTO: 0.18 THOUSAND/ΜL (ref 0–0.61)
EOSINOPHIL NFR BLD AUTO: 3 % (ref 0–6)
ERYTHROCYTE [DISTWIDTH] IN BLOOD BY AUTOMATED COUNT: 12.2 % (ref 11.6–15.1)
GFR SERPL CREATININE-BSD FRML MDRD: 101 ML/MIN/1.73SQ M
GLUCOSE SERPL-MCNC: 107 MG/DL (ref 65–140)
HCT VFR BLD AUTO: 41.9 % (ref 34.8–46.1)
HGB BLD-MCNC: 14.1 G/DL (ref 11.5–15.4)
IMM GRANULOCYTES # BLD AUTO: 0.01 THOUSAND/UL (ref 0–0.2)
IMM GRANULOCYTES NFR BLD AUTO: 0 % (ref 0–2)
LYMPHOCYTES # BLD AUTO: 1.77 THOUSANDS/ΜL (ref 0.6–4.47)
LYMPHOCYTES NFR BLD AUTO: 29 % (ref 14–44)
MCH RBC QN AUTO: 30.1 PG (ref 26.8–34.3)
MCHC RBC AUTO-ENTMCNC: 33.7 G/DL (ref 31.4–37.4)
MCV RBC AUTO: 90 FL (ref 82–98)
MONOCYTES # BLD AUTO: 0.44 THOUSAND/ΜL (ref 0.17–1.22)
MONOCYTES NFR BLD AUTO: 7 % (ref 4–12)
NEUTROPHILS # BLD AUTO: 3.75 THOUSANDS/ΜL (ref 1.85–7.62)
NEUTS SEG NFR BLD AUTO: 61 % (ref 43–75)
NRBC BLD AUTO-RTO: 0 /100 WBCS
PLATELET # BLD AUTO: 253 THOUSANDS/UL (ref 149–390)
PMV BLD AUTO: 9.9 FL (ref 8.9–12.7)
POTASSIUM SERPL-SCNC: 3.5 MMOL/L (ref 3.5–5.3)
RBC # BLD AUTO: 4.68 MILLION/UL (ref 3.81–5.12)
SODIUM SERPL-SCNC: 142 MMOL/L (ref 135–147)
TSH SERPL DL<=0.05 MIU/L-ACNC: 10.21 UIU/ML (ref 0.45–4.5)
WBC # BLD AUTO: 6.16 THOUSAND/UL (ref 4.31–10.16)

## 2022-10-02 PROCEDURE — 99285 EMERGENCY DEPT VISIT HI MDM: CPT | Performed by: EMERGENCY MEDICINE

## 2022-10-02 PROCEDURE — 99285 EMERGENCY DEPT VISIT HI MDM: CPT

## 2022-10-02 PROCEDURE — 85025 COMPLETE CBC W/AUTO DIFF WBC: CPT | Performed by: EMERGENCY MEDICINE

## 2022-10-02 PROCEDURE — 93005 ELECTROCARDIOGRAM TRACING: CPT

## 2022-10-02 PROCEDURE — 80048 BASIC METABOLIC PNL TOTAL CA: CPT | Performed by: EMERGENCY MEDICINE

## 2022-10-02 PROCEDURE — 84484 ASSAY OF TROPONIN QUANT: CPT | Performed by: EMERGENCY MEDICINE

## 2022-10-02 PROCEDURE — 36415 COLL VENOUS BLD VENIPUNCTURE: CPT | Performed by: EMERGENCY MEDICINE

## 2022-10-02 PROCEDURE — 84443 ASSAY THYROID STIM HORMONE: CPT | Performed by: EMERGENCY MEDICINE

## 2022-10-02 NOTE — ED PROVIDER NOTES
Final Diagnosis:  1  Atypical chest pain        Chief Complaint   Patient presents with    Chest Pain     Patient comes tonight with complaint of chest pain that she states woke her up 45 minutes ago,drove herself here     HPI  Patient w/ h/o sarcoidosis after mediastinal biopsy    Yesterday normal  Went to sleep  Woke with left arm tingling  Got up walked around and still with some intermittent chest tightness  Was anxious but not now but still having intermittent symptoms  Hashimoto thyroiditis  No throat discomfort  No stool change  No hair change  No heat intolerance  No weight loss  No meds daily  Chest discomfort nonexertional  No n/v  No diaphoresis  - No language barrier    - History obtained from patient  - There are no limitations to the history obtained  - Previous charting underwent limited review with attention to last ED visits, labs, ekgs, and prior imaging  PMH:   has a past medical history of Fracture, Mediastinal adenopathy (02/2019), Thyroid nodule, and Vertigo  PSH:   has a past surgical history that includes Tubal ligation; Breast surgery (Right); and pr colonoscopy flx dx w/collj spec when pfrmd (N/A, 4/9/2018)  Social History:    Tobacco Use: Low Risk     Smoking Tobacco Use: Never Smoker    Smokeless Tobacco Use: Never Used     Alcohol Use: Not on file     Patient with no illicit use    ROS:    Pertinent positives/negatives:   Review of Systems   Cardiovascular: Positive for chest pain  Psychiatric/Behavioral: The patient is nervous/anxious  CONSTITUTIONAL:  No dizziness  No weakness  No unexpected weight loss  EYES:  No pain, erythema, or discharge  No loss of vision  ENT:  No tinnitus, decreased hearing  No epistaxis/purulent drainage  No voice change, airway closing, trismus  CARDIOVASCULAR:  No chest pain  No palpitations  No new lower extremity edema  RESPIRATORY:  No purulent cough  No hemoptysis  No dyspnea  No paroxysmal nocturnal dyspnea  No stridor, audible wheezing bedside  GASTROINTESTINAL:  Normal appetite  No vomiting, diarrhea  No pain  No bloating  No melena  GENITOURINARY:  No frequency, urgency, nocturia  No hematuria or dysuria  No discharge  No sores/adenopathy  MUSCULOSKELETAL:  No arthralgias or myalgias that are new  INTEGUMENTARY:  No swelling  No unexpected contusions  No abrasions  No lymphangitis  NEUROLOGIC:  No meningismus  No numbness of the extremities  No new focal weakness  No postural instability  PSYCHIATRIC:  No SI HI AVH  HEMATOLOGICAL:  No bleeding  No petechiae  No bruising  ALLERGIES:  No urticaria  No sudden abd cramping  No stridor  PE:     Physical exam highlights:   Physical Exam       Vitals:    10/02/22 0237 10/02/22 0315 10/02/22 0330 10/02/22 0400   BP:       BP Location:       Pulse:  76 76 82   Resp:       Temp: 98 3 °F (36 8 °C)      TempSrc: Oral      SpO2:  97% 96% 98%   Weight:       Height:         Vitals reviewed by me  Nursing note reviewed  Chaperone present for all sensitive exam   Const: No acute distress  Alert  Nontoxic  Not diaphoretic  HEENT: External ears normal  No protrusion drainage swelling  Nose normal  No drainage/traumatic deformity  MMM  Mouth with baseline/symmetric movement  No trismus  Eyes: No squinting  No icterus  Tracks through the room with normal EOM  No tearing/swelling/drainage  Neck: ROM normal  No rigidity  No meningismus  Cards: Rate as per vitals  Compared to monitor sinus unless documented above  Regular  Well perfused  Pulm: able to verbalize without additional effort  Effort and excursion normal  No disress  No audible wheezing/ stridor  Normal resp rate  Abd: No distension beyond baseline  No fluctuant wave  Patient without peritoneal pain with shifting/bumping the bed  MSK: ROM normal and baseline  No deformity  Skin: No new rashes visible  Well perfused  Neuro: Nonfocal  Baseline  CN grossly intact  Moving all four with coordination  Psych: Normal behavior and affect  A:  - Nursing note reviewed  Ddx and MDM  Atypical ACS? Thyroid disfunction? Delta trop  tsh      HEART Risk Score    Flowsheet Row Most Recent Value   Heart Score Risk Calculator    History 0 Filed at: 10/02/2022 0234   ECG 0 Filed at: 10/02/2022 0234   Age 1 Filed at: 10/02/2022 0234   Risk Factors 1 Filed at: 10/02/2022 0234   Troponin 0 Filed at: 10/02/2022 0234   HEART Score 2 Filed at: 10/02/2022 0234                       ED Course as of 10/03/22 0512   Sun Oct 02, 2022   0216 Procedure Note: EKG  Date/Time: 10/02/22 2:17 AM   Interpreted by: Deanna Bajwa  Indications / Diagnosis: CP  ECG reviewed by me, the ED Provider: yes   The EKG demonstrates:  Rhythm: sinus    Intervals: normal intervals  Axis: normal axis  QRS/Blocks: normal QRS  ST Changes: No acute ST Changes, no STD/VIKA  T wave changes: none       0227 Needs delta     No orders to display     Orders Placed This Encounter   Procedures    CBC and differential    Basic metabolic panel    HS Troponin 0hr (reflex protocol)    TSH    HS Troponin I 2hr    ECG 12 lead     Labs Reviewed   BASIC METABOLIC PANEL - Abnormal       Result Value Ref Range Status    Sodium 142  135 - 147 mmol/L Final    Potassium 3 5  3 5 - 5 3 mmol/L Final    Chloride 107  96 - 108 mmol/L Final    CO2 25  21 - 32 mmol/L Final    ANION GAP 10  4 - 13 mmol/L Final    BUN 10  5 - 25 mg/dL Final    Creatinine 0 59 (*) 0 60 - 1 30 mg/dL Final    Comment: Standardized to IDMS reference method    Glucose 107  65 - 140 mg/dL Final    Comment: If the patient is fasting, the ADA then defines impaired fasting glucose as > 100 mg/dL and diabetes as > or equal to 123 mg/dL  Specimen collection should occur prior to Sulfasalazine administration due to the potential for falsely depressed results  Specimen collection should occur prior to Sulfapyridine administration due to the potential for falsely elevated results      Calcium 8 6 8 3 - 10 1 mg/dL Final    eGFR 101  ml/min/1 73sq m Final    Narrative:     [place] guidelines for Chronic Kidney Disease (CKD):     Stage 1 with normal or high GFR (GFR > 90 mL/min/1 73 square meters)    Stage 2 Mild CKD (GFR = 60-89 mL/min/1 73 square meters)    Stage 3A Moderate CKD (GFR = 45-59 mL/min/1 73 square meters)    Stage 3B Moderate CKD (GFR = 30-44 mL/min/1 73 square meters)    Stage 4 Severe CKD (GFR = 15-29 mL/min/1 73 square meters)    Stage 5 End Stage CKD (GFR <15 mL/min/1 73 square meters)  Note: GFR calculation is accurate only with a steady state creatinine   TSH, 3RD GENERATION - Abnormal    TSH 3RD GENERATON 10 211 (*) 0 450 - 4 500 uIU/mL Final    Comment: The recommended reference ranges for TSH during pregnancy are as follows:   First trimester 0 1 to 2 5 uIU/mL   Second trimester  0 2 to 3 0 uIU/mL   Third trimester 0 3 to 3 0 uIU/m    Note: Normal ranges may not apply to patients who are transgender, non-binary, or whose legal sex, sex at birth, and gender identity differ  Adult TSH (3rd generation) reference range follows the recommended guidelines of the American Thyroid Association, January, 2020  Narrative:     Patients undergoing fluorescein dye angiography may retain small amounts of fluorescein in the body for 48-72 hours post procedure  Samples containing fluorescein can produce falsely depressed TSH values  If the patient had this procedure,a specimen should be resubmitted post fluorescein clearance  HS TROPONIN I 0HR - Normal    hs TnI 0hr 2  "Refer to ACS Flowchart"- see link ng/L Final    Comment:                                              Initial (time 0) result  If >=50 ng/L, Myocardial injury suggested ;  Type of myocardial injury and treatment strategy  to be determined  If 5-49 ng/L, a delta result at 2 hours and or 4 hours will be needed to further evaluate    If <4 ng/L, and chest pain has been >3 hours since onset, patient may qualify for discharge based on the HEART score in the ED  If <5 ng/L and <3hours since onset of chest pain, a delta result at 2 hours will be needed to further evaluate  HS Troponin 99th Percentile URL of a Health Population=12 ng/L with a 95% Confidence Interval of 8-18 ng/L  Second Troponin (time 2 hours)  If calculated delta >= 20 ng/L,  Myocardial injury suggested ; Type of myocardial injury and treatment strategy to be determined  If 5-49 ng/L and the calculated delta is 5-19 ng/L, consult medical service for evaluation  Continue evaluation for ischemia on ecg and other possible etiology and repeat hs troponin at 4 hours  If delta is <5 ng/L at 2 hours, consider discharge based on risk stratification via the HEART score (if in ED), or HERON risk score in IP/Observation  HS Troponin 99th Percentile URL of a Health Population=12 ng/L with a 95% Confidence Interval of 8-18 ng/L    HS TROPONIN I 2HR - Normal    hs TnI 2hr <2  "Refer to ACS Flowchart"- see link ng/L Final    Comment:                                              Initial (time 0) result  If >=50 ng/L, Myocardial injury suggested ;  Type of myocardial injury and treatment strategy  to be determined  If 5-49 ng/L, a delta result at 2 hours and or 4 hours will be needed to further evaluate  If <4 ng/L, and chest pain has been >3 hours since onset, patient may qualify for discharge based on the HEART score in the ED  If <5 ng/L and <3hours since onset of chest pain, a delta result at 2 hours will be needed to further evaluate  HS Troponin 99th Percentile URL of a Health Population=12 ng/L with a 95% Confidence Interval of 8-18 ng/L  Second Troponin (time 2 hours)  If calculated delta >= 20 ng/L,  Myocardial injury suggested ; Type of myocardial injury and treatment strategy to be determined  If 5-49 ng/L and the calculated delta is 5-19 ng/L, consult medical service for evaluation    Continue evaluation for ischemia on ecg and other possible etiology and repeat hs troponin at 4 hours  If delta is <5 ng/L at 2 hours, consider discharge based on risk stratification via the HEART score (if in ED), or HERON risk score in IP/Observation  HS Troponin 99th Percentile URL of a Health Population=12 ng/L with a 95% Confidence Interval of 8-18 ng/L  Delta 2hr hsTnI <0  <20 ng/L Final   CBC AND DIFFERENTIAL    WBC 6 16  4 31 - 10 16 Thousand/uL Final    RBC 4 68  3 81 - 5 12 Million/uL Final    Hemoglobin 14 1  11 5 - 15 4 g/dL Final    Hematocrit 41 9  34 8 - 46 1 % Final    MCV 90  82 - 98 fL Final    MCH 30 1  26 8 - 34 3 pg Final    MCHC 33 7  31 4 - 37 4 g/dL Final    RDW 12 2  11 6 - 15 1 % Final    MPV 9 9  8 9 - 12 7 fL Final    Platelets 842  799 - 390 Thousands/uL Final    nRBC 0  /100 WBCs Final    Neutrophils Relative 61  43 - 75 % Final    Immat GRANS % 0  0 - 2 % Final    Lymphocytes Relative 29  14 - 44 % Final    Monocytes Relative 7  4 - 12 % Final    Eosinophils Relative 3  0 - 6 % Final    Basophils Relative 0  0 - 1 % Final    Neutrophils Absolute 3 75  1 85 - 7 62 Thousands/µL Final    Immature Grans Absolute 0 01  0 00 - 0 20 Thousand/uL Final    Lymphocytes Absolute 1 77  0 60 - 4 47 Thousands/µL Final    Monocytes Absolute 0 44  0 17 - 1 22 Thousand/µL Final    Eosinophils Absolute 0 18  0 00 - 0 61 Thousand/µL Final    Basophils Absolute 0 01  0 00 - 0 10 Thousands/µL Final       Final Diagnosis:  1  Atypical chest pain        P:  - hospital tx includes   Medications - No data to display      - disposition  Time reflects when diagnosis was documented in both MDM as applicable and the Disposition within this note     Time User Action Codes Description Comment    10/2/2022  4:43 AM Kajal Werner Add [R07 89] Atypical chest pain       ED Disposition     ED Disposition   Discharge    Condition   Stable    Date/Time   Sun Oct 2, 2022  4:43 AM    Comment   Lul Mccormick discharge to home/self care                 Follow-up Information     Follow up With Specialties Details Why Contact Indra Camp MD Family Medicine  for repeat stress test [id number] [phone]            - patient will call their PCP to let them know they were in the emergency department  We discuss return precautions       - additional tx intended, if consistent with primary provider:  - patient to follow with : There are no discharge medications for this patient  No discharge procedures on file  None       Portions of the record may have been created with voice recognition software  Occasional wrong word or "sound a like" substitutions may have occurred due to the inherent limitations of voice recognition software  Read the chart carefully and recognize, using context, where substitutions have occurred      Electronically signed by:  MD Nataliya Keith MD  10/03/22 [id number]

## 2022-10-03 LAB
ATRIAL RATE: 87 BPM
P AXIS: 71 DEGREES
PR INTERVAL: 144 MS
QRS AXIS: 72 DEGREES
QRSD INTERVAL: 80 MS
QT INTERVAL: 364 MS
QTC INTERVAL: 438 MS
T WAVE AXIS: 69 DEGREES
VENTRICULAR RATE: 87 BPM

## 2022-10-03 PROCEDURE — 93010 ELECTROCARDIOGRAM REPORT: CPT | Performed by: INTERNAL MEDICINE

## 2022-10-12 PROBLEM — J06.9 URI WITH COUGH AND CONGESTION: Status: RESOLVED | Noted: 2021-08-22 | Resolved: 2022-10-12

## 2022-10-12 PROBLEM — Z11.59 NEED FOR HEPATITIS C SCREENING TEST: Status: RESOLVED | Noted: 2021-11-14 | Resolved: 2022-10-12

## 2022-10-12 PROBLEM — R05.9 COUGH: Status: RESOLVED | Noted: 2021-08-01 | Resolved: 2022-10-12

## 2023-04-27 ENCOUNTER — APPOINTMENT (OUTPATIENT)
Dept: RADIOLOGY | Facility: CLINIC | Age: 58
End: 2023-04-27

## 2023-04-27 ENCOUNTER — OFFICE VISIT (OUTPATIENT)
Dept: URGENT CARE | Facility: CLINIC | Age: 58
End: 2023-04-27

## 2023-04-27 VITALS — RESPIRATION RATE: 18 BRPM | BODY MASS INDEX: 22.15 KG/M2 | HEIGHT: 63 IN | WEIGHT: 125 LBS | TEMPERATURE: 99.2 F

## 2023-04-27 DIAGNOSIS — T14.8XXA PUNCTURE: Primary | ICD-10-CM

## 2023-04-27 DIAGNOSIS — T14.8XXA PUNCTURE: ICD-10-CM

## 2023-04-27 RX ORDER — CIPROFLOXACIN 250 MG/1
250 TABLET, FILM COATED ORAL EVERY 12 HOURS SCHEDULED
Qty: 10 TABLET | Refills: 0 | Status: SHIPPED | OUTPATIENT
Start: 2023-04-27 | End: 2023-05-02

## 2023-04-27 NOTE — PATIENT INSTRUCTIONS
Puncture Wound   WHAT YOU NEED TO KNOW:   A puncture wound is a hole in the skin made by a sharp, pointed object  The area may be bruised or swollen  You may have bleeding, pain, or trouble moving the affected area  DISCHARGE INSTRUCTIONS:   Return to the emergency department if:   You have severe pain  You have numbness or tingling in the area of your wound  Your wound starts bleeding and does not stop, even after you apply pressure  Call your doctor if:   You have new drainage or a bad odor coming from the wound  You have a fever or chills  You have increased swelling, redness, or pain  You have red streaks on your skin coming from your wound  You have questions or concerns about your condition or care  Medicines: You may need any of the following:  NSAIDs , such as ibuprofen, help decrease swelling, pain, and fever  This medicine is available with or without a doctor's order  NSAIDs can cause stomach bleeding or kidney problems in certain people  If you take blood thinner medicine, always ask your healthcare provider if NSAIDs are safe for you  Always read the medicine label and follow directions  Antibiotics  help prevent a bacterial infection  Take your medicine as directed  Contact your healthcare provider if you think your medicine is not helping or if you have side effects  Tell your provider if you are allergic to any medicine  Keep a list of the medicines, vitamins, and herbs you take  Include the amounts, and when and why you take them  Bring the list or the pill bottles to follow-up visits  Carry your medicine list with you in case of an emergency  Care for your wound as directed:  Keep your wound clean and dry  When you are allowed to bathe, carefully wash the wound with soap and water  Dry the area and put on new, clean bandages as directed  Change your bandages when they get wet or dirty    Rest and elevate  the injured area above the level of your heart as often as you can  This will help decrease swelling and pain  Prop your injured area on pillows or blankets to keep it elevated comfortably  Follow up with your doctor in 2 to 3 days:  Write down your questions so you remember to ask them during your visits  © Copyright [employer] 2022 Information is for End User's use only and may not be sold, redistributed or otherwise used for commercial purposes  The above information is an  only  It is not intended as medical advice for individual conditions or treatments  Talk to your doctor, nurse or pharmacist before following any medical regimen to see if it is safe and effective for you

## 2023-04-27 NOTE — PROGRESS NOTES
[facility]        NAME: Jocelyn Nunez is a 61 y o  female  : 1965    MRN: [id number]  DATE: 2023  TIME: 3:24 PM    Assessment and Plan   Puncture [T14  8XXA]  1  Puncture  Tdap Vaccine greater than or equal to 6yo    XR foot 3+ vw left    ciprofloxacin (CIPRO) 250 mg tablet            Patient Instructions   Patient Instructions     Puncture Wound   WHAT YOU NEED TO KNOW:   A puncture wound is a hole in the skin made by a sharp, pointed object  The area may be bruised or swollen  You may have bleeding, pain, or trouble moving the affected area  DISCHARGE INSTRUCTIONS:   Return to the emergency department if:   • You have severe pain  • You have numbness or tingling in the area of your wound  • Your wound starts bleeding and does not stop, even after you apply pressure  Call your doctor if:   • You have new drainage or a bad odor coming from the wound  • You have a fever or chills  • You have increased swelling, redness, or pain  • You have red streaks on your skin coming from your wound  • You have questions or concerns about your condition or care  Medicines: You may need any of the following:  • NSAIDs , such as ibuprofen, help decrease swelling, pain, and fever  This medicine is available with or without a doctor's order  NSAIDs can cause stomach bleeding or kidney problems in certain people  If you take blood thinner medicine, always ask your healthcare provider if NSAIDs are safe for you  Always read the medicine label and follow directions  • Antibiotics  help prevent a bacterial infection  • Take your medicine as directed  Contact your healthcare provider if you think your medicine is not helping or if you have side effects  Tell your provider if you are allergic to any medicine  Keep a list of the medicines, vitamins, and herbs you take  Include the amounts, and when and why you take them   Bring the list or the pill bottles to follow-up visits  Carry your medicine list with you in case of an emergency  Care for your wound as directed:  Keep your wound clean and dry  When you are allowed to bathe, carefully wash the wound with soap and water  Dry the area and put on new, clean bandages as directed  Change your bandages when they get wet or dirty  Rest and elevate  the injured area above the level of your heart as often as you can  This will help decrease swelling and pain  Prop your injured area on pillows or blankets to keep it elevated comfortably  Follow up with your doctor in 2 to 3 days:  Write down your questions so you remember to ask them during your visits  © Copyright [employer] 2022 Information is for End User's use only and may not be sold, redistributed or otherwise used for commercial purposes  The above information is an  only  It is not intended as medical advice for individual conditions or treatments  Talk to your doctor, nurse or pharmacist before following any medical regimen to see if it is safe and effective for you  Follow up with PCP in 3-5 days  Proceed to  ER if symptoms worsen  Chief Complaint     Chief Complaint   Patient presents with   • Puncture Wound     Nail imbedded on board puctured top of lewft foot there is swelling of entire foot  Last TDAP unknown         History of Present Illness       The pt is a 79-year-old female presenting today for a puncture wound through the top of the Left foot  She reports the nail on a piece of wood punctured the top of her left foot  She was not wearing a shoe  The entire foot is now swollen and painful  Review of Systems   Review of Systems   Musculoskeletal: Positive for joint swelling  Skin: Positive for wound           Current Medications       Current Outpatient Medications:   •  ciprofloxacin (CIPRO) 250 mg tablet, Take 1 tablet (250 mg total) by mouth every 12 (twelve) hours for 5 days, Disp: 10 tablet, Rfl: 0    Current Allergies "    Allergies as of 04/27/2023   • (No Known Allergies)            The following portions of the patient's history were reviewed and updated as appropriate: allergies, current medications, past family history, past medical history, past social history, past surgical history and problem list      Past Medical History:   Diagnosis Date   • Fracture     collar bone r side   • Mediastinal adenopathy 02/2019    [id number]   • Thyroid nodule     currently too small to do a bx-watching   • Vertigo     from stress       Past Surgical History:   Procedure Laterality Date   • BREAST SURGERY Right     bx-benign   • [place] COLONOSCOPY FLX DX W/COLLJ SPEC WHEN PFRMD N/A 4/9/2018    Procedure: COLONOSCOPY;  Surgeon: Jono Sutton MD;  Location: [place] [address] GI LAB; Service: Gastroenterology   • TUBAL LIGATION         Family History   Problem Relation Age of Onset   • Thyroid cancer Mother    • Heart disease Sister 22        open heart-valve replaced-d/t strep throat         Medications have been verified  Objective   Temp 99 2 °F (37 3 °C)   Resp 18   Ht 5' 3\" (1 6 m)   Wt 56 7 kg (125 lb)   BMI 22 14 kg/m²        Physical Exam     Physical Exam  Vitals and nursing note reviewed  Constitutional:       General: She is not in acute distress  Appearance: Normal appearance  She is normal weight  She is not ill-appearing, toxic-appearing or diaphoretic  Cardiovascular:      Rate and Rhythm: Normal rate  Pulmonary:      Effort: Pulmonary effort is normal    Musculoskeletal:         General: Tenderness present  Normal range of motion  Comments: Small puncture wound over the top of her R foot   swelling over the top of the foot  TTP     Skin:     General: Skin is warm  Capillary Refill: Capillary refill takes less than 2 seconds  Neurological:      Mental Status: She is alert                     "

## 2023-08-02 ENCOUNTER — OFFICE VISIT (OUTPATIENT)
Dept: FAMILY MEDICINE CLINIC | Facility: CLINIC | Age: 58
End: 2023-08-02
Payer: COMMERCIAL

## 2023-08-02 VITALS
DIASTOLIC BLOOD PRESSURE: 80 MMHG | TEMPERATURE: 98.1 F | WEIGHT: 120.2 LBS | HEART RATE: 68 BPM | SYSTOLIC BLOOD PRESSURE: 104 MMHG | HEIGHT: 63 IN | OXYGEN SATURATION: 97 % | BODY MASS INDEX: 21.3 KG/M2 | RESPIRATION RATE: 16 BRPM

## 2023-08-02 DIAGNOSIS — M25.50 ARTHRALGIA, UNSPECIFIED JOINT: ICD-10-CM

## 2023-08-02 DIAGNOSIS — D86.9 SARCOID: ICD-10-CM

## 2023-08-02 DIAGNOSIS — Z13.1 SCREENING FOR DIABETES MELLITUS: ICD-10-CM

## 2023-08-02 DIAGNOSIS — J02.9 SORE THROAT: Primary | ICD-10-CM

## 2023-08-02 DIAGNOSIS — Z12.31 ENCOUNTER FOR SCREENING MAMMOGRAM FOR MALIGNANT NEOPLASM OF BREAST: ICD-10-CM

## 2023-08-02 DIAGNOSIS — M54.50 BILATERAL LOW BACK PAIN WITHOUT SCIATICA, UNSPECIFIED CHRONICITY: ICD-10-CM

## 2023-08-02 DIAGNOSIS — Z13.220 SCREENING FOR LIPID DISORDERS: ICD-10-CM

## 2023-08-02 DIAGNOSIS — Z13.29 SCREENING FOR THYROID DISORDER: ICD-10-CM

## 2023-08-02 LAB
S PYO AG THROAT QL: NEGATIVE
SL AMB  POCT GLUCOSE, UA: NORMAL
SL AMB LEUKOCYTE ESTERASE,UA: NORMAL
SL AMB POCT BILIRUBIN,UA: NORMAL
SL AMB POCT BLOOD,UA: NORMAL
SL AMB POCT CLARITY,UA: 6
SL AMB POCT COLOR,UA: 1
SL AMB POCT KETONES,UA: NORMAL
SL AMB POCT MONO TEST: NEGATIVE
SL AMB POCT NITRITE,UA: NORMAL
SL AMB POCT PH,UA: NORMAL
SL AMB POCT SPECIFIC GRAVITY,UA: NORMAL
SL AMB POCT URINE PROTEIN: NORMAL
SL AMB POCT UROBILINOGEN: NORMAL

## 2023-08-02 PROCEDURE — 86308 HETEROPHILE ANTIBODY SCREEN: CPT | Performed by: FAMILY MEDICINE

## 2023-08-02 PROCEDURE — 99214 OFFICE O/P EST MOD 30 MIN: CPT | Performed by: FAMILY MEDICINE

## 2023-08-02 PROCEDURE — 81003 URINALYSIS AUTO W/O SCOPE: CPT | Performed by: FAMILY MEDICINE

## 2023-08-02 PROCEDURE — 87880 STREP A ASSAY W/OPTIC: CPT | Performed by: FAMILY MEDICINE

## 2023-08-02 NOTE — PROGRESS NOTES
Assessment/Plan:    1. Sore throat  -     POCT rapid strepA  -     POCT mono  -     Throat culture; Future    2. Encounter for screening mammogram for malignant neoplasm of breast  -     Mammo screening bilateral w cad; Future; Expected date: 08/02/2023    3. Sarcoid  -     Comprehensive metabolic panel; Future    4. Bilateral low back pain without sciatica, unspecified chronicity  -     POCT urine dip auto non-scope    5. Arthralgia, unspecified joint  -     CBC; Future  -     Magnesium; Future  -     Lyme Total AB W Reflex to IGM/IGG; Future  -     Babesia microti antibody, IgG & Igm; Future  -     Sedimentation rate, automated; Future    6. Screening for thyroid disorder  -     TSH, 3rd generation; Future  -     Magnesium; Future    7. Screening for lipid disorders  -     Lipase; Future  -     Comprehensive metabolic panel; Future  -     Lipid Panel with Direct LDL reflex; Future  -     Magnesium; Future    8. Screening for diabetes mellitus  -     Hemoglobin A1C; Future    9. BMI 21.0-21.9, adult        Depression Screening and Follow-up Plan: Patient was screened for depression during today's encounter. They screened negative with a PHQ-2 score of 0. Subjective:      Patient ID: Mikey Rees is a 61 y.o. female. Chief Complaint   Patient presents with   • Cough     Headache , sore throat , diarrhea, back pain for the last 2 week , fatigued all the time          Sore Throat   The current episode started 1 to 4 weeks ago. There has been no fever. Associated symptoms include diarrhea, headaches, a hoarse voice, a plugged ear sensation and swollen glands. Pertinent negatives include no abdominal pain, shortness of breath, trouble swallowing or vomiting. Associated symptoms comments: Back pain, fatigue, joint pain. She has had no exposure to strep or mono. She has tried nothing for the symptoms.        The following portions of the patient's history were reviewed and updated as appropriate: allergies, current medications, past family history, past medical history, past social history, past surgical history and problem list.  Past Medical History:   Diagnosis Date   • Fracture     collar bone r side   • Mediastinal adenopathy 02/2019    [id number]   • Sarcoid    • Thyroid nodule     currently too small to do a bx-watching   • Vertigo     from stress     Past Surgical History:   Procedure Laterality Date   • BREAST SURGERY Right     bx-benign   • [place] COLONOSCOPY FLX DX W/COLLJ SPEC WHEN PFRMD N/A 4/9/2018    Procedure: COLONOSCOPY;  Surgeon: Mary Jane Newsome MD;  Location: [address] [place] [address] [address] GI LAB; Service: Gastroenterology   • TUBAL LIGATION       Review of Systems   HENT: Positive for hoarse voice and sore throat. Negative for trouble swallowing. Respiratory: Negative for shortness of breath. Gastrointestinal: Positive for diarrhea. Negative for abdominal pain and vomiting. Neurological: Positive for headaches. No current outpatient medications on file. No current facility-administered medications for this visit. Objective:    /80 (BP Location: Left arm, Patient Position: Sitting, Cuff Size: Standard)   Pulse 68   Temp 98.1 °F (36.7 °C)   Resp 16   Ht 5' 3" (1.6 m)   Wt 54.5 kg (120 lb 3.2 oz)   SpO2 97%   BMI 21.29 kg/m²        Physical Exam  Vitals and nursing note reviewed. Constitutional:       General: She is not in acute distress. Appearance: Normal appearance. HENT:      Nose: Congestion present. Mouth/Throat:      Pharynx: Posterior oropharyngeal erythema present. No oropharyngeal exudate. Eyes:      General: No scleral icterus. Conjunctiva/sclera: Conjunctivae normal.   Cardiovascular:      Rate and Rhythm: Normal rate and regular rhythm. Pulmonary:      Effort: Pulmonary effort is normal. No respiratory distress. Breath sounds: Normal breath sounds. Abdominal:      General: Bowel sounds are normal.      Palpations: Abdomen is soft. Tenderness: There is no abdominal tenderness. Musculoskeletal:      Cervical back: Neck supple. No tenderness. Skin:     General: Skin is warm and dry. Coloration: Skin is not jaundiced. Findings: No rash. Neurological:      General: No focal deficit present. Mental Status: She is alert and oriented to person, place, and time. Cranial Nerves: No cranial nerve deficit.    Psychiatric:         Mood and Affect: Mood normal.                Hany Elizabeth MD

## 2023-08-05 PROBLEM — Z13.29 SCREENING FOR THYROID DISORDER: Status: ACTIVE | Noted: 2020-07-14

## 2023-08-05 PROBLEM — Z13.1 SCREENING FOR DIABETES MELLITUS: Status: ACTIVE | Noted: 2020-07-14

## 2023-08-05 PROBLEM — M25.50 ARTHRALGIA: Status: ACTIVE | Noted: 2023-08-05

## 2023-08-05 PROBLEM — Z13.220 SCREENING FOR LIPID DISORDERS: Status: ACTIVE | Noted: 2020-07-14

## 2023-08-05 LAB — B-HEM STREP SPEC QL CULT: NEGATIVE

## 2023-10-04 PROBLEM — Z13.1 SCREENING FOR DIABETES MELLITUS: Status: RESOLVED | Noted: 2020-07-14 | Resolved: 2023-10-04

## 2023-11-07 ENCOUNTER — TELEPHONE (OUTPATIENT)
Age: 58
End: 2023-11-07

## 2023-11-07 NOTE — TELEPHONE ENCOUNTER
Patients GI provider:  Dr. Jf Sanz    Number to return call: [phone]    Reason for call: Pt calling in to schedule colonoscopy. Patient wants to know when she should schedule her next colonoscopy. Patient is requesting a call back to further discuss,thank you.     Scheduled procedure/appointment date if applicable: Apt/procedure n/a

## 2023-11-09 NOTE — TELEPHONE ENCOUNTER
Called and spoke with patient. Her last colonoscopy was on 4/8/2018 and she is to repeat in 10 years for a colonoscopy screening. Told her to call if she has a problem prior and she would need to be seen first. Then the doctor would determine if she needs a colonoscopy sooner than the 10 years.

## 2023-12-15 DIAGNOSIS — Z12.31 ENCOUNTER FOR SCREENING MAMMOGRAM FOR MALIGNANT NEOPLASM OF BREAST: ICD-10-CM

## 2024-01-25 ENCOUNTER — TELEPHONE (OUTPATIENT)
Age: 59
End: 2024-01-25

## 2024-01-25 NOTE — TELEPHONE ENCOUNTER
Pt called in to make an appt for newly diagnosed osteoporosis. I advised pt she would need a referral. She stated she had one. I asked that it be faxed to us. She declined to do that. Stated she would find a different doctor. Pt disconnected the call.

## 2024-02-21 PROBLEM — Z12.11 SCREEN FOR COLON CANCER: Status: RESOLVED | Noted: 2018-03-09 | Resolved: 2024-02-21

## 2024-05-27 ENCOUNTER — HOSPITAL ENCOUNTER (EMERGENCY)
Facility: HOSPITAL | Age: 59
Discharge: HOME | End: 2024-05-27
Attending: EMERGENCY MEDICINE | Admitting: EMERGENCY MEDICINE
Payer: COMMERCIAL

## 2024-05-27 VITALS
HEART RATE: 86 BPM | TEMPERATURE: 97.8 F | DIASTOLIC BLOOD PRESSURE: 86 MMHG | WEIGHT: 126.76 LBS | BODY MASS INDEX: 22.46 KG/M2 | RESPIRATION RATE: 18 BRPM | SYSTOLIC BLOOD PRESSURE: 143 MMHG | OXYGEN SATURATION: 99 %

## 2024-05-27 DIAGNOSIS — B02.9 SHINGLES: Primary | ICD-10-CM

## 2024-05-27 PROCEDURE — 99284 EMERGENCY DEPT VISIT MOD MDM: CPT | Performed by: EMERGENCY MEDICINE

## 2024-05-27 PROCEDURE — 87252 VIRUS INOCULATION TISSUE: CPT | Performed by: EMERGENCY MEDICINE

## 2024-05-27 PROCEDURE — 99282 EMERGENCY DEPT VISIT SF MDM: CPT

## 2024-05-27 PROCEDURE — 88112 CYTOPATH CELL ENHANCE TECH: CPT | Performed by: PATHOLOGY

## 2024-05-27 RX ORDER — VALACYCLOVIR HYDROCHLORIDE 500 MG/1
1000 TABLET, FILM COATED ORAL EVERY 8 HOURS SCHEDULED
Status: COMPLETED | OUTPATIENT
Start: 2024-05-27 | End: 2024-05-27

## 2024-05-27 RX ORDER — VALACYCLOVIR HYDROCHLORIDE 1 G/1
1000 TABLET, FILM COATED ORAL 3 TIMES DAILY
Qty: 42 TABLET | Refills: 0 | Status: SHIPPED | OUTPATIENT
Start: 2024-05-27 | End: 2024-06-06 | Stop reason: SDUPTHER

## 2024-05-27 RX ADMIN — VALACYCLOVIR HYDROCHLORIDE 1000 MG: 500 TABLET, FILM COATED ORAL at 11:34

## 2024-05-27 NOTE — DISCHARGE INSTRUCTIONS
Return to the ER for further concerns or worsening symptoms  Follow up with your primary care physician in 1-2 days  Take medications for 14 days or until new lesions stop appearing and all lesions have crusted over  You have pending lab tests. You will be notified if test results are abnormal

## 2024-05-27 NOTE — Clinical Note
Kelly Bello was seen and treated in our emergency department on 5/27/2024.                Diagnosis:     Kelly  may return to work on return date.    She may return on this date: 06/03/2024         If you have any questions or concerns, please don't hesitate to call.      Lashonda England, DO    ______________________________           _______________          _______________  Hospital Representative                              Date                                Time

## 2024-05-27 NOTE — ED PROVIDER NOTES
History  Chief Complaint   Patient presents with    Rash     Pt reported rash on hip and buttocks. Started 3 days prior. C/o pain with rash.      Patient is a 58-year-old female that presents emergency department with a rash.  She describes as a painful rash that began approximately 3 days ago.  She denies fevers or chills.  She denies sick contacts.  She denies a history of similar rash.      History provided by:  Patient   used: No        None       Past Medical History:   Diagnosis Date    Fracture     collar bone r side    Mediastinal adenopathy 02/2019    [id number]    Sarcoid     Thyroid nodule     currently too small to do a bx-watching    Vertigo     from stress       Past Surgical History:   Procedure Laterality Date    BREAST SURGERY Right     bx-benign    [place] COLONOSCOPY FLX DX W/COLLJ SPEC WHEN PFRMD N/A 4/9/2018    Procedure: COLONOSCOPY;  Surgeon: Aubrey Gillespie MD;  Location: [facility] GI LAB;  Service: Gastroenterology    TUBAL LIGATION         Family History   Problem Relation Age of Onset    Thyroid cancer Mother     Heart disease Sister 25        open heart-valve replaced-d/t strep throat     I have reviewed and agree with the history as documented.    E-Cigarette/Vaping    E-Cigarette Use Never User      E-Cigarette/Vaping Substances    Nicotine No     THC No     CBD No      Social History     Tobacco Use    Smoking status: Never    Smokeless tobacco: Never   Vaping Use    Vaping status: Never Used   Substance Use Topics    Alcohol use: Yes     Alcohol/week: 1.0 standard drink of alcohol     Types: 1 Glasses of wine per week     Comment: daily    Drug use: No       Review of Systems   Constitutional:  Negative for chills and fever.   Respiratory:  Negative for cough, chest tightness and shortness of breath.    Gastrointestinal:  Negative for abdominal pain, diarrhea, nausea and vomiting.   Genitourinary:  Negative for dysuria, frequency, hematuria and urgency.   Musculoskeletal:   Negative for back pain, neck pain and neck stiffness.   Skin:  Positive for rash. Negative for color change, pallor and wound.   All other systems reviewed and are negative.      Physical Exam  Physical Exam  Vitals and nursing note reviewed.   Constitutional:       General: She is not in acute distress.     Appearance: She is well-developed. She is not diaphoretic.   HENT:      Head: Normocephalic and atraumatic.   Eyes:      Conjunctiva/sclera: Conjunctivae normal.      Pupils: Pupils are equal, round, and reactive to light.   Pulmonary:      Effort: Pulmonary effort is normal. No respiratory distress.   Musculoskeletal:         General: No deformity. Normal range of motion.      Cervical back: Normal range of motion and neck supple.   Skin:     General: Skin is warm and dry.      Capillary Refill: Capillary refill takes less than 2 seconds.      Coloration: Skin is not pale.      Findings: Erythema and rash present.      Comments: Vesicular rash with an erythematous base noted to right buttock and right labia majora.   Neurological:      General: No focal deficit present.      Mental Status: She is alert and oriented to person, place, and time.      Cranial Nerves: No cranial nerve deficit.   Psychiatric:         Behavior: Behavior normal.         Vital Signs  ED Triage Vitals   Temperature Pulse Respirations Blood Pressure SpO2   05/27/24 1148 05/27/24 1026 05/27/24 1026 05/27/24 1026 05/27/24 1026   97.8 °F (36.6 °C) 94 18 143/86 99 %      Temp Source Heart Rate Source Patient Position - Orthostatic VS BP Location FiO2 (%)   05/27/24 1148 05/27/24 1026 05/27/24 1026 05/27/24 1026 --   Tympanic Monitor Sitting Left arm       Pain Score       05/27/24 1026       4           Vitals:    05/27/24 1026 05/27/24 1030   BP: 143/86 143/86   Pulse: 94 86   Patient Position - Orthostatic VS: Sitting Lying         Visual Acuity      ED Medications  Medications   valACYclovir (VALTREX) tablet 1,000 mg (1,000 mg Oral Given  5/27/24 1134)       Diagnostic Studies  Results Reviewed       Procedure Component Value Units Date/Time    Varicella culture [[id number]] Collected: 05/27/24 1124    Lab Status: In process Specimen: Other from Lesion Updated: 05/27/24 1130                   No orders to display              Procedures  Procedures         ED Course                                             Medical Decision Making  58-year-old female in ED with complaint of a painful rash.  Given the dermatomal distribution of the rash, I am convinced that patient has shingles.  She was recently exposed to her daughter who is 38 weeks pregnant, and she is significantly concerned and would like definitive diagnosis.  Will send a Tzanck smear and varicella smear and start patient on a course of Valtrex.    Amount and/or Complexity of Data Reviewed  Labs: ordered.    Risk  Prescription drug management.             Disposition  Final diagnoses:   Shingles     Time reflects when diagnosis was documented in both MDM as applicable and the Disposition within this note       Time User Action Codes Description Comment    5/27/2024 11:13 AM Lashonda England [B02.9] Shingles           ED Disposition       ED Disposition   Discharge    Condition   Stable    Date/Time   Mon May 27, 2024 11:13 AM    Comment   Kelly Bello discharge to home/self care.                   Follow-up Information       Follow up With Specialties Details Why Contact Info    Shelbie Shirley MD Family Medicine Schedule an appointment as soon as possible for a visit in 1 day for follow up [address] [place] [id number]  [phone]              Discharge Medication List as of 5/27/2024 11:24 AM        START taking these medications    Details   valACYclovir (VALTREX) 1,000 mg tablet Take 1 tablet (1,000 mg total) by mouth 3 (three) times a day for 14 days Take medications for 14 days or until new lesions stop appearing and all lesions have crusted over, Starting Mon  5/27/2024, Until Mon 6/10/2024, Normal             No discharge procedures on file.    PDMP Review       None            ED Provider  Electronically Signed by             Lashonda England DO  05/27/24 1956

## 2024-05-27 NOTE — ED NOTES
Patient requesting to speak with Dr. England.  Dr. England made aware.     Lauren Bañuelos RN  05/27/24 2623

## 2024-05-30 PROCEDURE — 88112 CYTOPATH CELL ENHANCE TECH: CPT | Performed by: PATHOLOGY

## 2024-06-06 ENCOUNTER — TELEPHONE (OUTPATIENT)
Age: 59
End: 2024-06-06

## 2024-06-06 DIAGNOSIS — B02.9 SHINGLES: ICD-10-CM

## 2024-06-06 RX ORDER — VALACYCLOVIR HYDROCHLORIDE 1 G/1
1000 TABLET, FILM COATED ORAL 3 TIMES DAILY
Qty: 21 TABLET | Refills: 0 | Status: SHIPPED | OUTPATIENT
Start: 2024-06-06 | End: 2024-06-13

## 2024-06-06 NOTE — TELEPHONE ENCOUNTER
Treatment for shingles is only 7 days 3 times a day.  I sent in a 7-day course for the patient please advise her if she completed the 7 days she does not need to continue on the medications given that the research says it should only be for 7 days

## 2024-06-06 NOTE — TELEPHONE ENCOUNTER
Kelly was seen at [facility] ED on 5/27 for Shingles.  She was prescribed VALTREX.  She dropped her pill bottle and her remaining pills fell into water and disinigrated.   She still had 1 more week of pills to take (3 pills a day x 7 days =  21 pills lost)    She is wondering if we are able to refill her VALTREX for the 21 pills she lost so she can finish the week of medication.     Please advise and return call to patient.   [facility].     Thank you!

## 2024-07-01 LAB — VZV SPEC QL CULT: NORMAL

## 2024-07-08 ENCOUNTER — OFFICE VISIT (OUTPATIENT)
Dept: FAMILY MEDICINE CLINIC | Facility: CLINIC | Age: 59
End: 2024-07-08
Payer: COMMERCIAL

## 2024-07-08 VITALS
RESPIRATION RATE: 16 BRPM | SYSTOLIC BLOOD PRESSURE: 110 MMHG | DIASTOLIC BLOOD PRESSURE: 62 MMHG | OXYGEN SATURATION: 98 % | WEIGHT: 118.6 LBS | TEMPERATURE: 97.8 F | HEIGHT: 63 IN | BODY MASS INDEX: 21.02 KG/M2 | HEART RATE: 86 BPM

## 2024-07-08 DIAGNOSIS — R07.89 ATYPICAL CHEST PAIN: ICD-10-CM

## 2024-07-08 DIAGNOSIS — R53.83 MALAISE AND FATIGUE: ICD-10-CM

## 2024-07-08 DIAGNOSIS — E78.2 MIXED HYPERLIPIDEMIA: ICD-10-CM

## 2024-07-08 DIAGNOSIS — M25.50 ARTHRALGIA, UNSPECIFIED JOINT: ICD-10-CM

## 2024-07-08 DIAGNOSIS — D86.9 SARCOID: ICD-10-CM

## 2024-07-08 DIAGNOSIS — Z00.00 ANNUAL PHYSICAL EXAM: Primary | ICD-10-CM

## 2024-07-08 DIAGNOSIS — Z13.1 SCREENING FOR DIABETES MELLITUS: ICD-10-CM

## 2024-07-08 DIAGNOSIS — Z13.220 SCREENING FOR LIPID DISORDERS: ICD-10-CM

## 2024-07-08 DIAGNOSIS — Z11.59 NEED FOR HEPATITIS C SCREENING TEST: ICD-10-CM

## 2024-07-08 DIAGNOSIS — R10.819 SUPRAPUBIC TENDERNESS: ICD-10-CM

## 2024-07-08 DIAGNOSIS — R53.81 MALAISE AND FATIGUE: ICD-10-CM

## 2024-07-08 DIAGNOSIS — E55.9 VITAMIN D DEFICIENCY: ICD-10-CM

## 2024-07-08 DIAGNOSIS — E06.3 HASHIMOTO'S THYROIDITIS: ICD-10-CM

## 2024-07-08 LAB
SL AMB  POCT GLUCOSE, UA: ABNORMAL
SL AMB LEUKOCYTE ESTERASE,UA: 25
SL AMB POCT BILIRUBIN,UA: ABNORMAL
SL AMB POCT BLOOD,UA: ABNORMAL
SL AMB POCT CLARITY,UA: CLEAR
SL AMB POCT COLOR,UA: YELLOW
SL AMB POCT KETONES,UA: ABNORMAL
SL AMB POCT NITRITE,UA: ABNORMAL
SL AMB POCT PH,UA: 6
SL AMB POCT SPECIFIC GRAVITY,UA: 1.01
SL AMB POCT URINE PROTEIN: ABNORMAL
SL AMB POCT UROBILINOGEN: 1

## 2024-07-08 PROCEDURE — 99173 VISUAL ACUITY SCREEN: CPT | Performed by: FAMILY MEDICINE

## 2024-07-08 PROCEDURE — 81003 URINALYSIS AUTO W/O SCOPE: CPT | Performed by: FAMILY MEDICINE

## 2024-07-08 PROCEDURE — 99396 PREV VISIT EST AGE 40-64: CPT | Performed by: FAMILY MEDICINE

## 2024-07-09 ENCOUNTER — TELEPHONE (OUTPATIENT)
Dept: ADMINISTRATIVE | Facility: OTHER | Age: 59
End: 2024-07-09

## 2024-07-09 PROCEDURE — 93000 ELECTROCARDIOGRAM COMPLETE: CPT | Performed by: FAMILY MEDICINE

## 2024-07-09 NOTE — TELEPHONE ENCOUNTER
Upon review of the In Basket request we were able to locate, review, and update the patient chart as requested for Pap Smear (HPV) aka Cervical Cancer Screening.    Any additional questions or concerns should be emailed to the Practice Liaisons via the appropriate education email address, please do not reply via In Basket.    Thank you  Tejal Short   [facility]

## 2024-07-09 NOTE — TELEPHONE ENCOUNTER
----- Message from Kandis LORENZ sent at 7/8/2024  2:41 PM EDT -----  Regarding: care gap request  07/08/24 2:41 PM    Hello, our patient attached above has had Pap Smear (HPV) aka Cervical Cancer Screening completed/performed. Please assist in updating the patient chart by making an External outreach to [facility]  facility located in [place]. The date of service is 2024.    Thank you,  Kandis JAMESON

## 2024-07-10 LAB
BACTERIA UR CULT: NORMAL
Lab: NORMAL

## 2024-07-10 NOTE — PROGRESS NOTES
[facility] HEALTH MAINTENANCE OFFICE VISIT  [facility] - [facility]    NAME: Kelly Bello  AGE: 58 y.o. SEX: female  : 1965     DATE: 2024    Assessment and Plan     1. Annual physical exam  -     POCT urine dip auto non-scope  -     CBC; Future  -     Comprehensive metabolic panel; Future  -     Hemoglobin A1C; Future  -     Hepatitis C antibody; Future  -     Lipase; Future  -     Lipid Panel with Direct LDL reflex; Future  -     Magnesium; Future  -     TSH, 3rd generation; Future  -     Vitamin D 25 hydroxy; Future  -     T4, free; Future  -     T3, free; Future  -     Lyme Total AB W Reflex to IGM/IGG; Future  -     Babesia microti antibody, IgG & Igm; Future  -     PTH, intact; Future  -     CBC  -     Comprehensive metabolic panel  -     Hemoglobin A1C  -     Hepatitis C antibody  -     Lipase  -     Lipid Panel with Direct LDL reflex  -     Magnesium  -     TSH, 3rd generation  -     Vitamin D 25 hydroxy  -     T4, free  -     T3, free  -     Lyme Total AB W Reflex to IGM/IGG  -     Babesia microti antibody, IgG & Igm  -     PTH, intact  -     UA (URINE) with reflex to Scope; Future  -     Urine culture; Future  -     UA (URINE) with reflex to Scope  -     Urine culture  2. Screening for lipid disorders  -     Lipase; Future  -     Lipid Panel with Direct LDL reflex; Future  -     Lipase  -     Lipid Panel with Direct LDL reflex  3. Screening for diabetes mellitus  -     Comprehensive metabolic panel; Future  -     Hemoglobin A1C; Future  -     Comprehensive metabolic panel  -     Hemoglobin A1C  4. Need for hepatitis C screening test  -     Hepatitis C antibody; Future  -     Hepatitis C antibody  5. Vitamin D deficiency  -     Magnesium; Future  -     Vitamin D 25 hydroxy; Future  -     PTH, intact; Future  -     Magnesium  -     Vitamin D 25 hydroxy  -     PTH, intact  6. Malaise and fatigue  -     Lyme Total AB W Reflex to IGM/IGG; Future  -      Babesia microti antibody, IgG & Igm; Future  -     Lyme Total AB W Reflex to IGM/IGG  -     Babesia microti antibody, IgG & Igm  7. Arthralgia, unspecified joint  -     Lyme Total AB W Reflex to IGM/IGG; Future  -     Babesia microti antibody, IgG & Igm; Future  -     Lyme Total AB W Reflex to IGM/IGG  -     Babesia microti antibody, IgG & Igm  8. Hashimoto's thyroiditis  -     TSH, 3rd generation; Future  -     T4, free; Future  -     T3, free; Future  -     TSH, 3rd generation  -     T4, free  -     T3, free  9. Mixed hyperlipidemia  -     Comprehensive metabolic panel; Future  -     Lipase; Future  -     Lipid Panel with Direct LDL reflex; Future  -     Comprehensive metabolic panel  -     Lipase  -     Lipid Panel with Direct LDL reflex  10. Atypical chest pain  -     Ambulatory Referral to Cardiology; Future  -     POCT ECG  11. Sarcoid  -     Ambulatory Referral to Cardiology; Future  12. Suprapubic tenderness  -     UA (URINE) with reflex to Scope; Future  -     Urine culture; Future  -     UA (URINE) with reflex to Scope  -     Urine culture  13. BMI 21.0-21.9, adult      Patient Counseling:   Nutrition: Stressed importance of a well balanced diet, moderation of sodium/saturated fat, caloric balance and sufficient intake of fiber  Exercise: Stressed the importance of regular exercise with a goal of 150 minutes per week  Dental Health: Discussed daily flossing and brushing and regular dental visits   Sexuality: Discussed sexually transmitted infections, use of condoms and prevention of unintended pregnancy  Alcohol Use:  Recommended moderation of alcohol intake  Injury Prevention: Discussed Safety Belts, Safety Helmets, and Smoke Detectors    Immunizations reviewed: Risks and Benefits discussed  Discussed benefits of:  Colon Cancer Screening, Mammogram , Cervical Cancer screening, and Screening labs.  BMI Counseling: Body mass index is 21.01 kg/m². Discussed with patient's BMI with her. The BMI is at  goal.          Chief Complaint     Chief Complaint   Patient presents with   • Physical Exam       History of Present Illness     HPI    Well Adult Physical   Patient here for a comprehensive physical exam.      Diet and Physical Activity  Diet: well balanced diet  Exercise: several times per week      Depression Screen  PHQ-2/9 Depression Screening    Little interest or pleasure in doing things: 0 - not at all  Feeling down, depressed, or hopeless: 0 - not at all  PHQ-2 Score: 0  PHQ-2 Interpretation: Negative depression screen          General Health  Hearing: Normal:  bilateral  Vision: goes for regular eye exams  Dental: regular dental visits    Reproductive Health  Post-menopausal  and Follows with gynecologist--Patty Jenkins      The following portions of the patient's history were reviewed and updated as appropriate: allergies, current medications, past family history, past medical history, past social history, past surgical history and problem list.    Review of Systems     Review of Systems   Constitutional:  Positive for fatigue. Negative for fever.   Respiratory: Negative.     Cardiovascular: Negative.    Gastrointestinal: Negative.    Endocrine:        Thyroid dz   Musculoskeletal:  Positive for arthralgias.   Neurological: Negative.    Psychiatric/Behavioral:  Positive for sleep disturbance.        Past Medical History     Past Medical History:   Diagnosis Date   • Fracture     collar bone r side   • Mediastinal adenopathy 02/2019    [id number]   • Sarcoid    • Thyroid nodule     currently too small to do a bx-watching   • Vertigo     from stress       Past Surgical History     Past Surgical History:   Procedure Laterality Date   • BREAST SURGERY Right     bx-benign   • MO COLONOSCOPY FLX DX W/COLLJ SPEC WHEN PFRMD N/A 4/9/2018    Procedure: COLONOSCOPY;  Surgeon: Aubrey Gillespie MD;  Location: [facility];  Service: Gastroenterology   • TUBAL LIGATION         Social History     Social History  "    Socioeconomic History   • Marital status: /Civil Union     Spouse name: None   • Number of children: None   • Years of education: None   • Highest education level: None   Occupational History   • None   Tobacco Use   • Smoking status: Never   • Smokeless tobacco: Never   Vaping Use   • Vaping status: Never Used   Substance and Sexual Activity   • Alcohol use: Yes     Alcohol/week: 1.0 standard drink of alcohol     Types: 1 Glasses of wine per week     Comment: daily   • Drug use: No   • Sexual activity: None   Other Topics Concern   • None   Social History Narrative    Caffeine use- 1 cup of coffee per day     Social Determinants of Health     Financial Resource Strain: Not on file   Food Insecurity: Not on file   Transportation Needs: Not on file   Physical Activity: Not on file   Stress: Not on file   Social Connections: Not on file   Intimate Partner Violence: Not on file   Housing Stability: Not on file       Family History     Family History   Problem Relation Age of Onset   • Thyroid cancer Mother    • Heart disease Sister 25        open heart-valve replaced-d/t strep throat       Current Medications       Current Outpatient Medications:   •  valACYclovir (VALTREX) 1,000 mg tablet, Take 1 tablet (1,000 mg total) by mouth 3 (three) times a day for 7 days, Disp: 21 tablet, Rfl: 0     Allergies     No Known Allergies    Objective     /62 (BP Location: Left arm, Patient Position: Sitting, Cuff Size: Standard)   Pulse 86   Temp 97.8 °F (36.6 °C)   Resp 16   Ht 5' 3\" (1.6 m)   Wt 53.8 kg (118 lb 9.6 oz)   SpO2 98%   BMI 21.01 kg/m²      Physical Exam  Vitals and nursing note reviewed.   Constitutional:       General: She is not in acute distress.     Appearance: Normal appearance.   HENT:      Nose: Nose normal.      Mouth/Throat:      Pharynx: No oropharyngeal exudate or posterior oropharyngeal erythema.   Eyes:      General: No scleral icterus.     Conjunctiva/sclera: Conjunctivae normal. "   Cardiovascular:      Rate and Rhythm: Normal rate and regular rhythm.   Pulmonary:      Effort: Pulmonary effort is normal. No respiratory distress.      Breath sounds: Normal breath sounds.   Abdominal:      General: Bowel sounds are normal.      Palpations: Abdomen is soft.      Tenderness: There is no abdominal tenderness.   Musculoskeletal:         General: No tenderness. Normal range of motion.      Cervical back: Neck supple. No tenderness.   Skin:     General: Skin is warm and dry.      Coloration: Skin is not jaundiced.      Findings: Lesion (keloid right patella) present. No rash.   Neurological:      General: No focal deficit present.      Mental Status: She is alert and oriented to person, place, and time.      Cranial Nerves: No cranial nerve deficit.   Psychiatric:         Mood and Affect: Mood normal.           Vision Screening    Right eye Left eye Both eyes   Without correction 20/40 20/40 20/40   With correction              Shelbie Shirley MD  [facility]

## 2024-08-07 PROBLEM — Z11.59 NEED FOR HEPATITIS C SCREENING TEST: Status: RESOLVED | Noted: 2024-07-08 | Resolved: 2024-08-07

## 2024-08-07 PROBLEM — Z13.1 SCREENING FOR DIABETES MELLITUS: Status: RESOLVED | Noted: 2020-07-14 | Resolved: 2024-08-07

## 2025-02-22 ENCOUNTER — TELEMEDICINE (OUTPATIENT)
Dept: OTHER | Facility: HOSPITAL | Age: 60
End: 2025-02-22
Payer: COMMERCIAL

## 2025-02-22 DIAGNOSIS — J06.9 UPPER RESPIRATORY TRACT INFECTION, UNSPECIFIED TYPE: Primary | ICD-10-CM

## 2025-02-22 PROCEDURE — 99213 OFFICE O/P EST LOW 20 MIN: CPT | Performed by: PHYSICIAN ASSISTANT

## 2025-02-22 RX ORDER — AZITHROMYCIN 250 MG/1
TABLET, FILM COATED ORAL
Qty: 6 TABLET | Refills: 0 | Status: SHIPPED | OUTPATIENT
Start: 2025-02-22 | End: 2025-02-26

## 2025-02-22 NOTE — PATIENT INSTRUCTIONS
Start antibiotics as directed  Over the counter medications as directed  Follow up with PCP  ER if worsen

## 2025-02-22 NOTE — PROGRESS NOTES
Virtual Regular VisitName: Kelly Bello      : 1965      MRN: [id number]  Encounter Provider: Elin Tejeda PA-C  Encounter Date: 2025   Encounter department: VIRTUAL CARE   :  Assessment & Plan  Upper respiratory tract infection, unspecified type    Orders:    azithromycin (ZITHROMAX) 250 mg tablet; Take 2 tablets today then 1 tablet daily x 4 days        History of Present Illness     Patient states that she started with a bad headache and sore throat. When she coughs in her chest she has a burning sensation.  She does have sinus congestion.  It has been a few days. She did not do covid/ flu testing.  Her daughter and grandson had flu A and she was around them.         Review of Systems   Constitutional:  Positive for chills.   HENT:  Positive for congestion, sinus pressure and sore throat.    Respiratory:  Positive for cough.    Musculoskeletal:  Positive for arthralgias.   Neurological:  Positive for headaches.       Objective   There were no vitals taken for this visit.    Physical Exam  Constitutional:       General: She is not in acute distress.     Appearance: Normal appearance. She is not ill-appearing, toxic-appearing or diaphoretic.   HENT:      Head: Normocephalic and atraumatic.      Nose: Congestion present.   Pulmonary:      Effort: Pulmonary effort is normal. No respiratory distress.   Skin:     General: Skin is warm and dry.   Neurological:      General: No focal deficit present.      Mental Status: She is alert and oriented to person, place, and time.         Administrative Statements   Encounter provider Elin Tejeda PA-C    The Patient is located at Home and in the following state in which I hold an active license [place].    The patient was identified by name and date of birth. Kelly Bello was informed that this is a telemedicine visit and that the visit is being conducted through the Epic Embedded platform. She agrees to proceed..  My office door was closed. No one else was  in the room.  She acknowledged consent and understanding of privacy and security of the video platform. The patient has agreed to participate and understands they can discontinue the visit at any time.    I have spent a total time of 5 minutes in caring for this patient on the day of the visit/encounter including Documenting in the medical record, Reviewing/placing orders in the medical record (including tests, medications, and/or procedures), and Obtaining or reviewing history  .

## 2025-04-23 ENCOUNTER — APPOINTMENT (OUTPATIENT)
Dept: LAB | Facility: HOSPITAL | Age: 60
End: 2025-04-23
Attending: FAMILY MEDICINE
Payer: COMMERCIAL

## 2025-04-23 ENCOUNTER — HOSPITAL ENCOUNTER (OUTPATIENT)
Dept: RADIOLOGY | Facility: HOSPITAL | Age: 60
Discharge: HOME | End: 2025-04-23
Attending: INTERNAL MEDICINE
Payer: COMMERCIAL

## 2025-04-23 DIAGNOSIS — E06.3 HASHIMOTO THYROIDITIS: ICD-10-CM

## 2025-04-23 DIAGNOSIS — M25.50 ARTHRALGIA, UNSPECIFIED JOINT: ICD-10-CM

## 2025-04-23 DIAGNOSIS — E04.9 NONTOXIC GOITER: ICD-10-CM

## 2025-04-23 DIAGNOSIS — R53.81 MALAISE AND FATIGUE: ICD-10-CM

## 2025-04-23 DIAGNOSIS — Z00.00 ANNUAL PHYSICAL EXAM: Primary | ICD-10-CM

## 2025-04-23 DIAGNOSIS — R53.83 MALAISE AND FATIGUE: ICD-10-CM

## 2025-04-23 DIAGNOSIS — M81.0 AGE-RELATED OSTEOPOROSIS WITHOUT CURRENT PATHOLOGICAL FRACTURE: ICD-10-CM

## 2025-04-23 LAB
B BURGDOR IGG+IGM SER QL IA: NEGATIVE
IGA SERPL-MCNC: 330 MG/DL (ref 66–433)
PHOSPHATE SERPL-MCNC: 3.3 MG/DL (ref 2.3–4.1)
TSH SERPL DL<=0.05 MIU/L-ACNC: 4.08 UIU/ML (ref 0.45–4.5)

## 2025-04-23 PROCEDURE — 86364 TISS TRNSGLTMNASE EA IG CLAS: CPT

## 2025-04-23 PROCEDURE — 86800 THYROGLOBULIN ANTIBODY: CPT

## 2025-04-23 PROCEDURE — 86618 LYME DISEASE ANTIBODY: CPT

## 2025-04-23 PROCEDURE — 86376 MICROSOMAL ANTIBODY EACH: CPT

## 2025-04-23 PROCEDURE — 76536 US EXAM OF HEAD AND NECK: CPT

## 2025-04-23 PROCEDURE — 84443 ASSAY THYROID STIM HORMONE: CPT

## 2025-04-23 PROCEDURE — 84100 ASSAY OF PHOSPHORUS: CPT

## 2025-04-23 PROCEDURE — 82784 ASSAY IGA/IGD/IGG/IGM EACH: CPT

## 2025-04-24 LAB
THYROGLOB AB SERPL-ACNC: <1 IU/ML (ref 0–0.9)
THYROPEROXIDASE AB SERPL-ACNC: 49 IU/ML (ref 0–34)
TTG IGA SER IA-ACNC: 0.7 U/ML (ref ?–10)

## 2025-05-29 ENCOUNTER — TELEPHONE (OUTPATIENT)
Dept: OTHER | Facility: OTHER | Age: 60
End: 2025-05-29

## 2025-05-29 NOTE — TELEPHONE ENCOUNTER
Patient requesting a call back to discuss test results.       Ordering Provider: Shelbie Shirley MD   Date Completed: 04/23/2025    Lab [x]  MRI []  X-Ray []  CT []  Colonoscopy []  EGD []  Biopsy []  Other []

## 2025-06-16 ENCOUNTER — OFFICE VISIT (OUTPATIENT)
Dept: FAMILY MEDICINE CLINIC | Facility: CLINIC | Age: 60
End: 2025-06-16
Payer: COMMERCIAL

## 2025-06-16 VITALS
OXYGEN SATURATION: 95 % | SYSTOLIC BLOOD PRESSURE: 114 MMHG | BODY MASS INDEX: 20.13 KG/M2 | HEART RATE: 81 BPM | RESPIRATION RATE: 16 BRPM | TEMPERATURE: 96 F | HEIGHT: 63 IN | WEIGHT: 113.6 LBS | DIASTOLIC BLOOD PRESSURE: 82 MMHG

## 2025-06-16 DIAGNOSIS — G44.89 HEADACHE SYNDROME: ICD-10-CM

## 2025-06-16 DIAGNOSIS — E55.9 VITAMIN D DEFICIENCY: ICD-10-CM

## 2025-06-16 DIAGNOSIS — H54.7 DECREASED VISUAL ACUITY: ICD-10-CM

## 2025-06-16 DIAGNOSIS — D86.9 SARCOID: ICD-10-CM

## 2025-06-16 DIAGNOSIS — Z00.00 ANNUAL PHYSICAL EXAM: Primary | ICD-10-CM

## 2025-06-16 DIAGNOSIS — E06.3 HASHIMOTO'S THYROIDITIS: ICD-10-CM

## 2025-06-16 LAB
SL AMB  POCT GLUCOSE, UA: NORMAL
SL AMB LEUKOCYTE ESTERASE,UA: NORMAL
SL AMB POCT BILIRUBIN,UA: NORMAL
SL AMB POCT BLOOD,UA: NORMAL
SL AMB POCT CLARITY,UA: CLEAR
SL AMB POCT COLOR,UA: YELLOW
SL AMB POCT KETONES,UA: NORMAL
SL AMB POCT NITRITE,UA: NORMAL
SL AMB POCT PH,UA: 6
SL AMB POCT SPECIFIC GRAVITY,UA: 1.03
SL AMB POCT URINE PROTEIN: NORMAL
SL AMB POCT UROBILINOGEN: NORMAL

## 2025-06-16 PROCEDURE — 81003 URINALYSIS AUTO W/O SCOPE: CPT | Performed by: FAMILY MEDICINE

## 2025-06-16 PROCEDURE — 99396 PREV VISIT EST AGE 40-64: CPT | Performed by: FAMILY MEDICINE

## 2025-06-16 PROCEDURE — 99173 VISUAL ACUITY SCREEN: CPT | Performed by: FAMILY MEDICINE

## 2025-06-16 RX ORDER — RISEDRONATE SODIUM 150 MG/1
TABLET, FILM COATED ORAL
COMMUNITY
Start: 2025-05-12

## 2025-06-16 NOTE — PROGRESS NOTES
Adult Annual Physical  Name: Kelly Bello      : 1965      MRN: [id number]  Encounter Provider: Shelbie Shirley MD  Encounter Date: 2025   Encounter department: [facility] [place] [facility]    :  Assessment & Plan  Annual physical exam    Orders:  •  POCT urine dip auto non-scope    Decreased visual acuity  Follow-up w ophthalmologist       Headache syndrome  Maintain hydration  Sched. follow-up eye exam.       BMI 20.0-20.9, adult         Vitamin D deficiency  Otc vit d supplement       Hashimoto's thyroiditis  Follows w Endocrinlogist       Sarcoid  Follows w Rheumatologist           Preventive Screenings:    - Cervical cancer screening: screening up-to-date   - Breast cancer screening: screening up-to-date     Immunizations:  - Immunizations due: Prevnar 20 and Zoster (Shingrix)         History of Present Illness     Adult Annual Physical:  Patient presents for annual physical.     Diet and Physical Activity:  - Diet/Nutrition: no special diet.  - Exercise: no formal exercise.    Depression Screening:  - PHQ-2 Score: 0    General Health:  - Sleep: sleeps poorly and 4-6 hours of sleep on average.  - Hearing: normal hearing right ear and normal hearing left ear.  - Vision: vision problems.  - Dental: regular dental visits and no dental visits for > 1 year.    /GYN Health:  - Follows with GYN: yes.   - Menopause: postmenopausal.     Advanced Care Planning:  - Has an advanced directive?: no    - Has a durable medical POA?: no      Review of Systems   Constitutional:  Positive for fatigue. Negative for fever.   Respiratory: Negative.     Cardiovascular: Negative.    Gastrointestinal: Negative.    Endocrine:        Thyroid dz   Musculoskeletal:  Positive for arthralgias.   Neurological: Negative.    Psychiatric/Behavioral:  Positive for sleep disturbance.      Past Medical History   Past Medical History[1]  Past Surgical History[2]  Family History[3]   reports that she has never smoked. She has  "never used smokeless tobacco. She reports current alcohol use of about 1.0 standard drink of alcohol per week. She reports that she does not use drugs.  Current Outpatient Medications   Medication Instructions   • risedronate (ACTONEL) 150 MG tablet TAKE 1 TABLET EVERY MONTH BY ORAL ROUTE FOR 1 DAY   Allergies[4]   Medications Ordered Prior to Encounter[5]   Social History[6]    Immunization History   Administered Date(s) Administered   • COVID-19 PFIZER VACCINE 0.3 ML IM 04/01/2021, 04/01/2021, 04/22/2021, 04/22/2021   • Tdap 04/27/2023, 04/27/2023       Objective   /82 (BP Location: Left arm, Patient Position: Sitting, Cuff Size: Standard)   Pulse 81   Temp (!) 96 °F (35.6 °C) (Tympanic)   Resp 16   Ht 5' 2.5\" (1.588 m)   Wt 51.5 kg (113 lb 9.6 oz)   SpO2 95%   BMI 20.45 kg/m²     Physical Exam  Vitals and nursing note reviewed.   Constitutional:       General: She is not in acute distress.     Appearance: Normal appearance.   HENT:      Nose: Nose normal.     Eyes:      General: No scleral icterus.     Conjunctiva/sclera: Conjunctivae normal.       Cardiovascular:      Rate and Rhythm: Normal rate and regular rhythm.   Pulmonary:      Effort: Pulmonary effort is normal. No respiratory distress.      Breath sounds: Normal breath sounds.   Abdominal:      Palpations: Abdomen is soft.      Tenderness: There is no abdominal tenderness. There is no right CVA tenderness, left CVA tenderness, guarding or rebound.     Musculoskeletal:         General: Normal range of motion.      Cervical back: Neck supple. No tenderness.      Right lower leg: No edema.      Left lower leg: No edema.     Skin:     General: Skin is warm and dry.      Coloration: Skin is not jaundiced.     Neurological:      General: No focal deficit present.      Mental Status: She is alert and oriented to person, place, and time.      Cranial Nerves: No cranial nerve deficit.     Psychiatric:         Mood and Affect: Mood normal.            "     [1]  Past Medical History:  Diagnosis Date   • Fracture     collar bone r side   • Mediastinal adenopathy 02/2019    [id number]   • Sarcoid    • Thyroid nodule     currently too small to do a bx-watching   • Vertigo     from stress   [2]  Past Surgical History:  Procedure Laterality Date   • BREAST SURGERY Right     bx-benign   • SD COLONOSCOPY FLX DX W/COLLJ SPEC WHEN PFRMD N/A 4/9/2018    Procedure: COLONOSCOPY;  Surgeon: Aubrey Gillespie MD;  Location: [facility];  Service: Gastroenterology   • TUBAL LIGATION     [3]  Family History  Problem Relation Name Age of Onset   • Thyroid cancer Mother     • Heart disease Sister  25        open heart-valve replaced-d/t strep throat   [4]  No Known Allergies[5]  Current Outpatient Medications on File Prior to Visit   Medication Sig Dispense Refill   • risedronate (ACTONEL) 150 MG tablet TAKE 1 TABLET EVERY MONTH BY ORAL ROUTE FOR 1 DAY       No current facility-administered medications on file prior to visit.   [6]  Social History  Tobacco Use   • Smoking status: Never   • Smokeless tobacco: Never   Vaping Use   • Vaping status: Never Used   Substance and Sexual Activity   • Alcohol use: Yes     Alcohol/week: 1.0 standard drink of alcohol     Types: 1 Glasses of wine per week     Comment: daily   • Drug use: No

## 2025-06-16 NOTE — PATIENT INSTRUCTIONS
"Patient Education     Routine physical for adults   The Basics   Written by the doctors and editors at [facility]   What is a physical? -- A physical is a routine visit, or \"check-up,\" with your doctor. You might also hear it called a \"wellness visit\" or \"preventive visit.\"  During each visit, the doctor will:   Ask about your physical and mental health   Ask about your habits, behaviors, and lifestyle   Do an exam   Give you vaccines if needed   Talk to you about any medicines you take   Give advice about your health   Answer your questions  Getting regular check-ups is an important part of taking care of your health. It can help your doctor find and treat any problems you have. But it's also important for preventing health problems.  A routine physical is different from a \"sick visit.\" A sick visit is when you see a doctor because of a health concern or problem. Since physicals are scheduled ahead of time, you can think about what you want to ask the doctor.  How often should I get a physical? -- It depends on your age and health. In general, for people age 21 years and older:   If you are younger than 50 years, you might be able to get a physical every 3 years.   If you are 50 years or older, your doctor might recommend a physical every year.  If you have an ongoing health condition, like diabetes or high blood pressure, your doctor will probably want to see you more often.  What happens during a physical? -- In general, each visit will include:   Physical exam - The doctor or nurse will check your height, weight, heart rate, and blood pressure. They will also look at your eyes and ears. They will ask about how you are feeling and whether you have any symptoms that bother you.   Medicines - It's a good idea to bring a list of all the medicines you take to each doctor visit. Your doctor will talk to you about your medicines and answer any questions. Tell them if you are having any side effects that bother you. You " "should also tell them if you are having trouble paying for any of your medicines.   Habits and behaviors - This includes:   Your diet   Your exercise habits   Whether you smoke, drink alcohol, or use drugs   Whether you are sexually active   Whether you feel safe at home  Your doctor will talk to you about things you can do to improve your health and lower your risk of health problems. They will also offer help and support. For example, if you want to quit smoking, they can give you advice and might prescribe medicines. If you want to improve your diet or get more physical activity, they can help you with this, too.   Lab tests, if needed - The tests you get will depend on your age and situation. For example, your doctor might want to check your:   Cholesterol   Blood sugar   Iron level   Vaccines - The recommended vaccines will depend on your age, health, and what vaccines you already had. Vaccines are very important because they can prevent certain serious or deadly infections.   Discussion of screening - \"Screening\" means checking for diseases or other health problems before they cause symptoms. Your doctor can recommend screening based on your age, risk, and preferences. This might include tests to check for:   Cancer, such as breast, prostate, cervical, ovarian, colorectal, prostate, lung, or skin cancer   Sexually transmitted infections, such as chlamydia and gonorrhea   Mental health conditions like depression and anxiety  Your doctor will talk to you about the different types of screening tests. They can help you decide which screenings to have. They can also explain what the results might mean.   Answering questions - The physical is a good time to ask the doctor or nurse questions about your health. If needed, they can refer you to other doctors or specialists, too.  Adults older than 65 years often need other care, too. As you get older, your doctor will talk to you about:   How to prevent falling at " home   Hearing or vision tests   Memory testing   How to take your medicines safely   Making sure that you have the help and support you need at home  All topics are updated as new evidence becomes available and our peer review process is complete.  This topic retrieved from [employer] on: May 02, 2024.  Topic 004068 Version 1.0  Release: 32.4.3 - C32.122  © 2024 UpToDate, Inc. and/or its affiliates. All rights reserved.  Consumer Information Use and Disclaimer   Disclaimer: This generalized information is a limited summary of diagnosis, treatment, and/or medication information. It is not meant to be comprehensive and should be used as a tool to help the user understand and/or assess potential diagnostic and treatment options. It does NOT include all information about conditions, treatments, medications, side effects, or risks that may apply to a specific patient. It is not intended to be medical advice or a substitute for the medical advice, diagnosis, or treatment of a health care provider based on the health care provider's examination and assessment of a patient's specific and unique circumstances. Patients must speak with a health care provider for complete information about their health, medical questions, and treatment options, including any risks or benefits regarding use of medications. This information does not endorse any treatments or medications as safe, effective, or approved for treating a specific patient. UpToDate, Inc. and its affiliates disclaim any warranty or liability relating to this information or the use thereof.The use of this information is governed by the Terms of Use, available at https://www.woltersJotvine.comuwer.com/en/know/clinical-effectiveness-terms. 2024© UpToDate, Inc. and its affiliates and/or licensors. All rights reserved.  Copyright   © 2024 UpToDate, Inc. and/or its affiliates. All rights reserved.

## 2025-07-26 ENCOUNTER — HOSPITAL ENCOUNTER (OUTPATIENT)
Facility: HOSPITAL | Age: 60
Discharge: HOME | End: 2025-07-26
Attending: INTERNAL MEDICINE
Payer: COMMERCIAL

## 2025-07-26 VITALS — HEIGHT: 62 IN | BODY MASS INDEX: 20.43 KG/M2 | WEIGHT: 111 LBS

## 2025-07-26 DIAGNOSIS — M81.0 AGE-RELATED OSTEOPOROSIS WITHOUT CURRENT PATHOLOGICAL FRACTURE: ICD-10-CM

## 2025-07-26 PROCEDURE — 77080 DXA BONE DENSITY AXIAL: CPT

## (undated) DEVICE — 1200CC GUARDIAN II: Brand: GUARDIAN

## (undated) DEVICE — "MH-443 SUCTION VALVE F/EVIS140 EVIS160": Brand: SUCTION VALVE

## (undated) DEVICE — TUBING BUBBLE CLEAR 5MM X 100 FT NS

## (undated) DEVICE — SOLIDIFIER FLUID WASTE CONTROL 1500ML

## (undated) DEVICE — "MAJ-901 WATER CONTAINER SET CV-160/140": Brand: WATER CONTAINER

## (undated) DEVICE — "MH-438 A/W VLVE F/140 EVIS-140": Brand: AIR/WATER VALVE

## (undated) DEVICE — GAUZE SPONGES,16 PLY: Brand: CURITY

## (undated) DEVICE — TUBING AUX CHANNEL

## (undated) DEVICE — LUBRICANT SURGILUBE TUBE 4 OZ  FLIP TOP

## (undated) DEVICE — DISPOSABLE BIOPSY VALVE MAJ-1555: Brand: SINGLE USE BIOPSY VALVE (STERILE)

## (undated) DEVICE — MEDI-VAC YANKAUER SUCTION HANDLE: Brand: CARDINAL HEALTH

## (undated) DEVICE — AIRLIFE™  ADULT CUSHION NASAL CANNULA WITH 7 FOOT (2.1 M) CRUSH-RESISTANT OXYGEN TUBING, AND U/CONNECT-IT ADAPTER: Brand: AIRLIFE™

## (undated) DEVICE — BRUSH ENDO CLEANING DBL-HEADER

## (undated) DEVICE — GLOVE EXAM NON-STRL NTRL PLUS LRG PF